# Patient Record
Sex: FEMALE | Race: WHITE | Employment: UNEMPLOYED | URBAN - METROPOLITAN AREA
[De-identification: names, ages, dates, MRNs, and addresses within clinical notes are randomized per-mention and may not be internally consistent; named-entity substitution may affect disease eponyms.]

---

## 2019-07-12 ENCOUNTER — HOSPITAL ENCOUNTER (OUTPATIENT)
Dept: GENERAL RADIOLOGY | Age: 24
Discharge: HOME | End: 2019-07-12
Payer: MEDICAID

## 2019-07-12 ENCOUNTER — HOSPITAL ENCOUNTER (OUTPATIENT)
Age: 24
Discharge: HOME | End: 2019-07-12
Payer: MEDICAID

## 2019-07-12 DIAGNOSIS — G91.9 HYDROCEPHALUS, UNSPECIFIED TYPE (HCC): ICD-10-CM

## 2019-07-12 PROCEDURE — 70360 X-RAY EXAM OF NECK: CPT

## 2021-12-13 ENCOUNTER — HOSPITAL ENCOUNTER (EMERGENCY)
Age: 26
Discharge: HOME | End: 2021-12-13
Attending: EMERGENCY MEDICINE
Payer: MEDICAID

## 2021-12-13 ENCOUNTER — APPOINTMENT (OUTPATIENT)
Dept: GENERAL RADIOLOGY | Age: 26
End: 2021-12-13
Payer: MEDICAID

## 2021-12-13 ENCOUNTER — APPOINTMENT (OUTPATIENT)
Dept: CT IMAGING | Age: 26
End: 2021-12-13
Payer: MEDICAID

## 2021-12-13 VITALS
SYSTOLIC BLOOD PRESSURE: 103 MMHG | RESPIRATION RATE: 18 BRPM | OXYGEN SATURATION: 95 % | WEIGHT: 223 LBS | HEART RATE: 79 BPM | BODY MASS INDEX: 35.84 KG/M2 | HEIGHT: 66 IN | TEMPERATURE: 97.3 F | DIASTOLIC BLOOD PRESSURE: 77 MMHG

## 2021-12-13 DIAGNOSIS — G40.919 BREAKTHROUGH SEIZURE (HCC): Primary | ICD-10-CM

## 2021-12-13 DIAGNOSIS — R56.9 SEIZURE-LIKE ACTIVITY (HCC): ICD-10-CM

## 2021-12-13 LAB
ALBUMIN SERPL-MCNC: 4.1 G/DL (ref 3.4–5)
ALP BLD-CCNC: 71 U/L (ref 40–129)
ALT SERPL-CCNC: 39 U/L (ref 10–40)
AMMONIA: 21 UMOL/L (ref 11–51)
ANION GAP SERPL CALCULATED.3IONS-SCNC: 13 MMOL/L (ref 3–16)
AST SERPL-CCNC: 28 U/L (ref 15–37)
BASOPHILS ABSOLUTE: 0 K/UL (ref 0–0.2)
BASOPHILS RELATIVE PERCENT: 0.6 %
BILIRUB SERPL-MCNC: <0.2 MG/DL (ref 0–1)
BILIRUBIN DIRECT: <0.2 MG/DL (ref 0–0.3)
BILIRUBIN, INDIRECT: NORMAL MG/DL (ref 0–1)
BUN BLDV-MCNC: 12 MG/DL (ref 7–20)
CALCIUM SERPL-MCNC: 9.1 MG/DL (ref 8.3–10.6)
CHLORIDE BLD-SCNC: 102 MMOL/L (ref 99–110)
CO2: 24 MMOL/L (ref 21–32)
CREAT SERPL-MCNC: 0.6 MG/DL (ref 0.6–1.1)
EOSINOPHILS ABSOLUTE: 0.1 K/UL (ref 0–0.6)
EOSINOPHILS RELATIVE PERCENT: 2 %
GFR AFRICAN AMERICAN: >60
GFR NON-AFRICAN AMERICAN: >60
GLUCOSE BLD-MCNC: 191 MG/DL (ref 70–99)
HCG QUALITATIVE: NEGATIVE
HCT VFR BLD CALC: 36.3 % (ref 36–48)
HEMOGLOBIN: 11.8 G/DL (ref 12–16)
LYMPHOCYTES ABSOLUTE: 2.1 K/UL (ref 1–5.1)
LYMPHOCYTES RELATIVE PERCENT: 36.2 %
MCH RBC QN AUTO: 26 PG (ref 26–34)
MCHC RBC AUTO-ENTMCNC: 32.6 G/DL (ref 31–36)
MCV RBC AUTO: 79.5 FL (ref 80–100)
MONOCYTES ABSOLUTE: 0.3 K/UL (ref 0–1.3)
MONOCYTES RELATIVE PERCENT: 4.6 %
NEUTROPHILS ABSOLUTE: 3.3 K/UL (ref 1.7–7.7)
NEUTROPHILS RELATIVE PERCENT: 56.6 %
PDW BLD-RTO: 14.4 % (ref 12.4–15.4)
PLATELET # BLD: 244 K/UL (ref 135–450)
PMV BLD AUTO: 9.2 FL (ref 5–10.5)
POTASSIUM REFLEX MAGNESIUM: 4 MMOL/L (ref 3.5–5.1)
RBC # BLD: 4.56 M/UL (ref 4–5.2)
SODIUM BLD-SCNC: 139 MMOL/L (ref 136–145)
TOTAL PROTEIN: 7.3 G/DL (ref 6.4–8.2)
WBC # BLD: 5.8 K/UL (ref 4–11)

## 2021-12-13 PROCEDURE — 99285 EMERGENCY DEPT VISIT HI MDM: CPT

## 2021-12-13 PROCEDURE — 85025 COMPLETE CBC W/AUTO DIFF WBC: CPT

## 2021-12-13 PROCEDURE — 36415 COLL VENOUS BLD VENIPUNCTURE: CPT

## 2021-12-13 PROCEDURE — 6360000002 HC RX W HCPCS: Performed by: EMERGENCY MEDICINE

## 2021-12-13 PROCEDURE — 84703 CHORIONIC GONADOTROPIN ASSAY: CPT

## 2021-12-13 PROCEDURE — 80048 BASIC METABOLIC PNL TOTAL CA: CPT

## 2021-12-13 PROCEDURE — 6370000000 HC RX 637 (ALT 250 FOR IP): Performed by: EMERGENCY MEDICINE

## 2021-12-13 PROCEDURE — 96374 THER/PROPH/DIAG INJ IV PUSH: CPT

## 2021-12-13 PROCEDURE — 70450 CT HEAD/BRAIN W/O DYE: CPT

## 2021-12-13 PROCEDURE — 71045 X-RAY EXAM CHEST 1 VIEW: CPT

## 2021-12-13 PROCEDURE — 80076 HEPATIC FUNCTION PANEL: CPT

## 2021-12-13 PROCEDURE — 82140 ASSAY OF AMMONIA: CPT

## 2021-12-13 RX ORDER — LORAZEPAM 2 MG/ML
1 INJECTION INTRAMUSCULAR ONCE
Status: COMPLETED | OUTPATIENT
Start: 2021-12-13 | End: 2021-12-13

## 2021-12-13 RX ORDER — LEVETIRACETAM 500 MG/1
500 TABLET ORAL ONCE
Status: COMPLETED | OUTPATIENT
Start: 2021-12-13 | End: 2021-12-13

## 2021-12-13 RX ORDER — LORAZEPAM 1 MG/1
1 TABLET ORAL EVERY 6 HOURS PRN
Qty: 30 TABLET | Refills: 0 | Status: SHIPPED | OUTPATIENT
Start: 2021-12-13 | End: 2022-01-12

## 2021-12-13 RX ADMIN — LORAZEPAM 1 MG: 2 INJECTION INTRAMUSCULAR; INTRAVENOUS at 12:15

## 2021-12-13 RX ADMIN — LEVETIRACETAM 500 MG: 500 TABLET, FILM COATED ORAL at 12:14

## 2021-12-13 ASSESSMENT — PAIN SCALES - GENERAL: PAINLEVEL_OUTOF10: 7

## 2021-12-13 ASSESSMENT — PAIN DESCRIPTION - PAIN TYPE: TYPE: ACUTE PAIN

## 2021-12-13 ASSESSMENT — PAIN DESCRIPTION - LOCATION: LOCATION: HEAD;ABDOMEN

## 2021-12-13 NOTE — ED NOTES
Bed: 02  Expected date:   Expected time:   Means of arrival: [facility]  Comments:  MEDIC [id number] [facility] [place], RN  12/13/21 9941

## 2021-12-13 NOTE — ED TRIAGE NOTES
Pt arrived via EMS from home d/t seizure like activity. EMS gave Versed 5mg for \"shaking\". Pt A&Ox4. C/o headache and abd pain rated at 7/10.

## 2021-12-20 NOTE — ED PROVIDER NOTES
Emergency Department Encounter    Patient: Cheryl Montanez  MRN: [id number]  : 1995  Date of Evaluation: 2021  ED Provider:  Hamilton Rosenthal MD    Triage Chief Complaint:   Seizures (arrived via EMS from home d/t 5 seizure at noc; witnessed seizure uncontrolled shaking that started approx 0900 that has lasted approx 10 minutes; versed 5mg via EMS; )    [place]:  Cheryl Montanez is a 31 y.o. female that presents to the ER for evaluation of both seizure and pseudoseizure, with intermittent tremor which is awake and alert when occurring, can be distracted, has a history of partial complex seizure,  shunt. Currently on Keppra. ROS - see HPI, below listed is current ROS at time of my eval:  General:  No fevers, no chills, no weakness  Eyes:  No recent vison changes, no discharge  ENT:  No sore throat, no nasal congestion, no hearing changes  Cardiovascular:  No chest pain  Respiratory:  No shortness of breath  Gastrointestinal:  No pain, no nausea, no vomiting, no diarrhea  Musculoskeletal:  No muscle pain, no joint pain  Skin:  No rash, no pruritis, no easy bruising  Neurologic:  No speech problems, + headache, no extremity numbness, no extremity tingling, no extremity weakness, no neck pain or stiffness  Psychiatric:  + anxiety  Extremities:  no edema, no pain    Past Medical History:   Diagnosis Date    Diabetes ([facility] [address]     Seizures ([facility] [address]     Stroke [facility])      Past Surgical History:   Procedure Laterality Date    BRAIN SURGERY      shunt     History reviewed. No pertinent family history.   Social History     Socioeconomic History    Marital status: Single     Spouse name: Not on file    Number of children: Not on file    Years of education: Not on file    Highest education level: Not on file   Occupational History    Not on file   Tobacco Use    Smoking status: Never Smoker    Smokeless tobacco: Never Used   Substance and Sexual Activity    Alcohol use: No    Drug use: No    Sexual activity: Not on file   Other Topics Concern    Not on file   Social History Narrative    Not on file     Social Determinants of Health     Financial Resource Strain:     Difficulty of Paying Living Expenses: Not on file   Food Insecurity:     Worried About Running Out of Food in the Last Year: Not on file    Alexa of Food in the Last Year: Not on file   Transportation Needs:     Lack of Transportation (Medical): Not on file    Lack of Transportation (Non-Medical): Not on file   Physical Activity:     Days of Exercise per Week: Not on file    Minutes of Exercise per Session: Not on file   Stress:     Feeling of Stress : Not on file   Social Connections:     Frequency of Communication with Friends and Family: Not on file    Frequency of Social Gatherings with Friends and Family: Not on file    Attends Jew Services: Not on file    Active Member of [address] [place] [address] [employer] or Organizations: Not on file    Attends Club or Organization Meetings: Not on file    Marital Status: Not on file   Intimate Partner Violence:     Fear of Current or Ex-Partner: Not on file    Emotionally Abused: Not on file    Physically Abused: Not on file    Sexually Abused: Not on file   Housing Stability:     Unable to Pay for Housing in the Last Year: Not on file    Number of [place] in the Last Year: Not on file    Unstable Housing in the Last Year: Not on file     No current facility-administered medications for this encounter. Current Outpatient Medications   Medication Sig Dispense Refill    LORazepam (ATIVAN) 1 MG tablet Take 1 tablet by mouth every 6 hours as needed for Anxiety (prn tremor/seizure, please take 1 mg twice daily for 3 days) for up to 30 days.  30 tablet 0    levETIRAcetam (KEPPRA) 500 MG tablet Take 500 mg by mouth 2 times daily      SITagliptin (JANUVIA) 25 MG tablet Take 25 mg by mouth daily      glimepiride (AMARYL) 4 MG tablet Take 4 mg by mouth every morning (before breakfast) Allergies   Allergen Reactions    Ancef [Cefazolin] Hives       Nursing Notes Reviewed    Physical Exam:  Triage VS:    ED Triage Vitals [12/13/21 1122]   Enc Vitals Group      /74      Pulse 86      Resp 20      Temp 97.3 °F (36.3 °C)      Temp Source Oral      SpO2 97 %      Weight 223 lb (101.2 kg)      Height 5' 6\" (1.676 m)      Head Circumference       Peak Flow       Pain Score       Pain Loc       Pain Edu? Excl. in [address]? My pulse ox interpretation is  normal    General appearance:  No acute distress. Skin:  Warm. Dry. Eye:  Extraocular movements intact. PERRL   Ears, nose, mouth and throat:  Oral mucosa moist   Neck:  Trachea midline. Extremity:  No swelling. Normal ROM     Heart:  Regular  Perfusion:  intact  Respiratory: Respirations nonlabored. Abdominal:   Non distended.              Neurological:  Alert and oriented times 3.  5 out of 5 motor strength in extremities, sensation intact to light touch in extremities, cranial nerves           grossly intact, visual fields intact, negative pronator drift, rapid alternating movements intact    I have reviewed and interpreted all of the currently available lab results from this visit (if applicable):  Results for orders placed or performed during the hospital encounter of 12/13/21   Ammonia   Result Value Ref Range    Ammonia 21 11 - 51 umol/L   Hepatic Function Panel   Result Value Ref Range    Total Protein 7.3 6.4 - 8.2 g/dL    Albumin 4.1 3.4 - 5.0 g/dL    Alkaline Phosphatase 71 40 - 129 U/L    ALT 39 10 - 40 U/L    AST 28 15 - 37 U/L    Total Bilirubin <0.2 0.0 - 1.0 mg/dL    Bilirubin, Direct <0.2 0.0 - 0.3 mg/dL    Bilirubin, Indirect see below 0.0 - 1.0 mg/dL   Basic Metabolic Panel w/ Reflex to MG   Result Value Ref Range    Sodium 139 136 - 145 mmol/L    Potassium reflex Magnesium 4.0 3.5 - 5.1 mmol/L    Chloride 102 99 - 110 mmol/L    CO2 24 21 - 32 mmol/L    Anion Gap 13 3 - 16    Glucose 191 (H) 70 - 99 mg/dL BUN 12 7 - 20 mg/dL    CREATININE 0.6 0.6 - 1.1 mg/dL    GFR Non-African American >60 >60    GFR African American >60 >60    Calcium 9.1 8.3 - 10.6 mg/dL   CBC Auto Differential   Result Value Ref Range    WBC 5.8 4.0 - 11.0 K/uL    RBC 4.56 4.00 - 5.20 M/uL    Hemoglobin 11.8 (L) 12.0 - 16.0 g/dL    Hematocrit 36.3 36.0 - 48.0 %    MCV 79.5 (L) 80.0 - 100.0 fL    MCH 26.0 26.0 - 34.0 pg    MCHC 32.6 31.0 - 36.0 g/dL    RDW 14.4 12.4 - 15.4 %    Platelets 498 084 - 855 K/uL    MPV 9.2 5.0 - 10.5 fL    Neutrophils % 56.6 %    Lymphocytes % 36.2 %    Monocytes % 4.6 %    Eosinophils % 2.0 %    Basophils % 0.6 %    Neutrophils Absolute 3.3 1.7 - 7.7 K/uL    Lymphocytes Absolute 2.1 1.0 - 5.1 K/uL    Monocytes Absolute 0.3 0.0 - 1.3 K/uL    Eosinophils Absolute 0.1 0.0 - 0.6 K/uL    Basophils Absolute 0.0 0.0 - 0.2 K/uL   HCG Qualitative, Serum   Result Value Ref Range    hCG Qual Negative Detects HCG level >10 MIU/mL      Radiographs (if obtained):  Radiologist's Report Reviewed:  CT HEAD WO CONTRAST    Result Date: 12/15/2021  EXAMINATION: CT OF THE HEAD WITHOUT CONTRAST  12/13/2021 11:51 am TECHNIQUE: CT of the head was performed without the administration of intravenous contrast. Dose modulation, iterative reconstruction, and/or weight based adjustment of the mA/kV was utilized to reduce the radiation dose to as low as reasonably achievable. COMPARISON: 02/22/2017. HISTORY: ORDERING SYSTEM PROVIDED HISTORY: sz,  shunt, ro obstruction TECHNOLOGIST PROVIDED HISTORY: Reason for exam:->sz,  shunt, ro obstruction Has a \"code stroke\" or \"stroke alert\" been called? ->No Decision Support Exception - unselect if not a suspected or confirmed emergency medical condition->Emergency Medical Condition (MA) Is the patient pregnant?->No Reason for Exam: sz,  shunt, ro obstruction FINDINGS: BRAIN/VENTRICLES: Right frontal ventriculostomy catheter is again identified. Distal tip of the catheter is unchanged in position.   The distal tip lies to the left of midline in the region of the frontal horn of the left lateral ventricle. The ventricular system remains diminutive in size, unchanged. No evidence of mass effect or midline shift. Minimal low attenuation along the course of the catheter within the right frontal lobe is unchanged. Foci of low attenuation in right basal ganglia/periventricular region again identified which may represent tiny foci remote ischemic change, unchanged. No new area of abnormal attenuation of brain parenchyma is identified. No abnormal extra-axial fluid collection is identified. ORBITS: The visualized portion of the orbits demonstrate no acute abnormality. SINUSES: The visualized paranasal sinuses and mastoid air cells demonstrate no acute abnormality. SOFT TISSUES/SKULL:  No acute abnormality of the visualized skull or soft tissues. No evidence of acute intracranial abnormality with no significant change in the appearance of the head CT when compared to the study of 02/22/2017. RECOMMENDATIONS: Unavailable     XR SHUNT SERIES PLACEMENT (<4 VIEWS)    Result Date: 12/13/2021  EXAMINATION: SHUNT SERIES 12/13/2021 12:05 pm COMPARISON: CT head, 12/13/2021 Shunt series, 07/12/2019 HISTORY: ORDERING SYSTEM PROVIDED HISTORY: sz, pain, ro obsturction TECHNOLOGIST PROVIDED HISTORY: Reason for exam:->sz, pain, ro obsturction Reason for Exam: sz, pain, ro obsturction FINDINGS: From right frontal approach, there is a ventriculostomy catheter. Intracranial portion is also visualized on CT with tip crossing midline. The extracranial portion has a contiguous course through the right neck and right-anterior chest.  The lower portion in the chest is difficult to visualize on the frontal view, but does appear contiguous on lateral projection. The catheter terminates in the left lower quadrant of the abdomen. No focal angulation or kinking is identified. Lungs are clear.   Bowel gas pattern is normal. Ventriculostomy catheter appears intact. MDM:  Patient presenting with a headache. Patient's neurologic exam is intact and nonfocal, and there is no evidence of meningeal signs. I completed a structured, evidence-based clinical evaluation to screen for subarachnoid hemorrhage and meningitis. The evidence indicates that the patient is very low risk for these and this is consistent with my clinical intuition. There is no history of significant head trauma. Patient was given medications here in the emergency department, on reevaluation patient is starting to feel better. The risk of further workup or hospitalization is likely higher than the risk of the patient having a subarachnoid hemorrhage or meningitis. It is, therefore, in the patients best interest not to do additional emergent testing, including but not limited to cranial imaging and or lumbar puncture her in the emergency department, based on the patient's presentation, history, and emergency department course, this was discussed with the patient and they understand and agree. At this point I do believe we can discharge patient, they need to follow-up with their primary care physician and/or neurologist, they understand and agree with the plan, return warnings given. Patient has no evidence of  shunt malfunction, no intracranial hemorrhage, no mass lesions, she had tremor-like episode which was able to be obtained with voice commands. Possible pseudoseizure versus partial complex breakthrough. She stable for outpatient management, I spoke with her neurologist she is to continue her Keppra and therapy, and Ativan as needed for breakthrough and anxiolysis. Clinical Impression:  1.  Breakthrough seizure ([address]    2. Seizure-like activity ([facility] [address]      Disposition referral (if applicable):  Tiara Nguyen MD  [address] [place] [address], [address] [place] [address] [phone]          Disposition medications (if applicable):  Discharge Medication List as of 12/13/2021  2:20 PM      START taking these medications    Details   LORazepam (ATIVAN) 1 MG tablet Take 1 tablet by mouth every 6 hours as needed for Anxiety (prn tremor/seizure, please take 1 mg twice daily for 3 days) for up to 30 days. , Disp-30 tablet, R-0Print             Comment: Please note this report has been produced using speech recognition software and may contain errors related to that system including errors in grammar, punctuation, and spelling, as well as words and phrases that may be inappropriate. Efforts were made to edit the dictations.       Darcie Painter MD  31/20/47 0596

## 2022-01-16 ENCOUNTER — HOSPITAL ENCOUNTER (INPATIENT)
Age: 27
LOS: 2 days | Discharge: HOME | DRG: 137 | End: 2022-01-18
Attending: EMERGENCY MEDICINE | Admitting: INTERNAL MEDICINE
Payer: MEDICAID

## 2022-01-16 ENCOUNTER — APPOINTMENT (OUTPATIENT)
Dept: CT IMAGING | Age: 27
DRG: 137 | End: 2022-01-16
Payer: MEDICAID

## 2022-01-16 ENCOUNTER — APPOINTMENT (OUTPATIENT)
Dept: GENERAL RADIOLOGY | Age: 27
DRG: 137 | End: 2022-01-16
Payer: MEDICAID

## 2022-01-16 PROBLEM — R56.9 SEIZURES (HCC): Status: ACTIVE | Noted: 2022-01-16

## 2022-01-16 PROBLEM — U07.1 COVID-19: Status: ACTIVE | Noted: 2022-01-16

## 2022-01-16 PROBLEM — R55 SYNCOPE AND COLLAPSE: Status: ACTIVE | Noted: 2022-01-16

## 2022-01-16 PROBLEM — R56.9 SEIZURE (HCC): Status: ACTIVE | Noted: 2022-01-16

## 2022-01-16 LAB
A/G RATIO: 1.3 (ref 1.1–2.2)
ALBUMIN SERPL-MCNC: 4.1 G/DL (ref 3.4–5)
ALP BLD-CCNC: 70 U/L (ref 40–129)
ALT SERPL-CCNC: 48 U/L (ref 10–40)
ANION GAP SERPL CALCULATED.3IONS-SCNC: 12 MMOL/L (ref 3–16)
AST SERPL-CCNC: 40 U/L (ref 15–37)
BASOPHILS ABSOLUTE: 0 K/UL (ref 0–0.2)
BASOPHILS RELATIVE PERCENT: 0.5 %
BILIRUB SERPL-MCNC: <0.2 MG/DL (ref 0–1)
BUN BLDV-MCNC: 7 MG/DL (ref 7–20)
CALCIUM SERPL-MCNC: 8.7 MG/DL (ref 8.3–10.6)
CHLORIDE BLD-SCNC: 102 MMOL/L (ref 99–110)
CO2: 21 MMOL/L (ref 21–32)
CREAT SERPL-MCNC: 0.7 MG/DL (ref 0.6–1.1)
EOSINOPHILS ABSOLUTE: 0.1 K/UL (ref 0–0.6)
EOSINOPHILS RELATIVE PERCENT: 1.2 %
GFR AFRICAN AMERICAN: >60
GFR NON-AFRICAN AMERICAN: >60
GLUCOSE BLD-MCNC: 119 MG/DL (ref 70–99)
GLUCOSE BLD-MCNC: 169 MG/DL (ref 70–99)
GONADOTROPIN, CHORIONIC (HCG) QUANT: <5 MIU/ML
HCT VFR BLD CALC: 38 % (ref 36–48)
HEMOGLOBIN: 12.6 G/DL (ref 12–16)
KEPPRA DOSE AMT: ABNORMAL
KEPPRA DOSE AMT: NORMAL
KEPPRA: <2 UG/ML (ref 6–46)
LYMPHOCYTES ABSOLUTE: 1.1 K/UL (ref 1–5.1)
LYMPHOCYTES RELATIVE PERCENT: 27.2 %
MCH RBC QN AUTO: 25.5 PG (ref 26–34)
MCHC RBC AUTO-ENTMCNC: 33 G/DL (ref 31–36)
MCV RBC AUTO: 77.2 FL (ref 80–100)
MONOCYTES ABSOLUTE: 0.2 K/UL (ref 0–1.3)
MONOCYTES RELATIVE PERCENT: 5.5 %
NEUTROPHILS ABSOLUTE: 2.7 K/UL (ref 1.7–7.7)
NEUTROPHILS RELATIVE PERCENT: 65.6 %
PDW BLD-RTO: 14.1 % (ref 12.4–15.4)
PERFORMED ON: ABNORMAL
PLATELET # BLD: 219 K/UL (ref 135–450)
PMV BLD AUTO: 8.1 FL (ref 5–10.5)
POTASSIUM SERPL-SCNC: 3.7 MMOL/L (ref 3.5–5.1)
PROLACTIN: 52.5 NG/ML
RBC # BLD: 4.92 M/UL (ref 4–5.2)
SARS-COV-2, NAAT: DETECTED
SODIUM BLD-SCNC: 135 MMOL/L (ref 136–145)
TOTAL PROTEIN: 7.3 G/DL (ref 6.4–8.2)
WBC # BLD: 4.1 K/UL (ref 4–11)

## 2022-01-16 PROCEDURE — 87635 SARS-COV-2 COVID-19 AMP PRB: CPT

## 2022-01-16 PROCEDURE — 6360000002 HC RX W HCPCS: Performed by: EMERGENCY MEDICINE

## 2022-01-16 PROCEDURE — 99285 EMERGENCY DEPT VISIT HI MDM: CPT

## 2022-01-16 PROCEDURE — 96365 THER/PROPH/DIAG IV INF INIT: CPT

## 2022-01-16 PROCEDURE — 84146 ASSAY OF PROLACTIN: CPT

## 2022-01-16 PROCEDURE — 36415 COLL VENOUS BLD VENIPUNCTURE: CPT

## 2022-01-16 PROCEDURE — 84702 CHORIONIC GONADOTROPIN TEST: CPT

## 2022-01-16 PROCEDURE — 72125 CT NECK SPINE W/O DYE: CPT

## 2022-01-16 PROCEDURE — 6370000000 HC RX 637 (ALT 250 FOR IP): Performed by: EMERGENCY MEDICINE

## 2022-01-16 PROCEDURE — 1200000000 HC SEMI PRIVATE

## 2022-01-16 PROCEDURE — 70250 X-RAY EXAM OF SKULL: CPT

## 2022-01-16 PROCEDURE — 85025 COMPLETE CBC W/AUTO DIFF WBC: CPT

## 2022-01-16 PROCEDURE — 70450 CT HEAD/BRAIN W/O DYE: CPT

## 2022-01-16 PROCEDURE — 80177 DRUG SCRN QUAN LEVETIRACETAM: CPT

## 2022-01-16 PROCEDURE — 93005 ELECTROCARDIOGRAM TRACING: CPT | Performed by: EMERGENCY MEDICINE

## 2022-01-16 PROCEDURE — 80053 COMPREHEN METABOLIC PANEL: CPT

## 2022-01-16 RX ORDER — LEVETIRACETAM 10 MG/ML
1000 INJECTION INTRAVASCULAR ONCE
Status: COMPLETED | OUTPATIENT
Start: 2022-01-16 | End: 2022-01-16

## 2022-01-16 RX ORDER — POLYETHYLENE GLYCOL 3350 17 G/17G
17 POWDER, FOR SOLUTION ORAL DAILY PRN
Status: DISCONTINUED | OUTPATIENT
Start: 2022-01-16 | End: 2022-01-18 | Stop reason: HOSPADM

## 2022-01-16 RX ORDER — NICOTINE POLACRILEX 4 MG
15 LOZENGE BUCCAL PRN
Status: DISCONTINUED | OUTPATIENT
Start: 2022-01-16 | End: 2022-01-18 | Stop reason: HOSPADM

## 2022-01-16 RX ORDER — LEVETIRACETAM 500 MG/1
500 TABLET ORAL 2 TIMES DAILY
Status: DISCONTINUED | OUTPATIENT
Start: 2022-01-16 | End: 2022-01-17

## 2022-01-16 RX ORDER — ONDANSETRON 2 MG/ML
4 INJECTION INTRAMUSCULAR; INTRAVENOUS EVERY 6 HOURS PRN
Status: DISCONTINUED | OUTPATIENT
Start: 2022-01-16 | End: 2022-01-18 | Stop reason: HOSPADM

## 2022-01-16 RX ORDER — SODIUM CHLORIDE 0.9 % (FLUSH) 0.9 %
5-40 SYRINGE (ML) INJECTION PRN
Status: DISCONTINUED | OUTPATIENT
Start: 2022-01-16 | End: 2022-01-18 | Stop reason: HOSPADM

## 2022-01-16 RX ORDER — DEXTROSE MONOHYDRATE 25 G/50ML
12.5 INJECTION, SOLUTION INTRAVENOUS PRN
Status: DISCONTINUED | OUTPATIENT
Start: 2022-01-16 | End: 2022-01-18 | Stop reason: HOSPADM

## 2022-01-16 RX ORDER — ACETAMINOPHEN 650 MG/1
650 SUPPOSITORY RECTAL EVERY 6 HOURS PRN
Status: DISCONTINUED | OUTPATIENT
Start: 2022-01-16 | End: 2022-01-18 | Stop reason: HOSPADM

## 2022-01-16 RX ORDER — SODIUM CHLORIDE 9 MG/ML
25 INJECTION, SOLUTION INTRAVENOUS PRN
Status: DISCONTINUED | OUTPATIENT
Start: 2022-01-16 | End: 2022-01-18 | Stop reason: HOSPADM

## 2022-01-16 RX ORDER — DEXTROSE MONOHYDRATE 50 MG/ML
100 INJECTION, SOLUTION INTRAVENOUS PRN
Status: DISCONTINUED | OUTPATIENT
Start: 2022-01-16 | End: 2022-01-18 | Stop reason: HOSPADM

## 2022-01-16 RX ORDER — SODIUM CHLORIDE 0.9 % (FLUSH) 0.9 %
5-40 SYRINGE (ML) INJECTION EVERY 12 HOURS SCHEDULED
Status: DISCONTINUED | OUTPATIENT
Start: 2022-01-16 | End: 2022-01-18 | Stop reason: HOSPADM

## 2022-01-16 RX ORDER — ACETAMINOPHEN 325 MG/1
650 TABLET ORAL ONCE
Status: COMPLETED | OUTPATIENT
Start: 2022-01-16 | End: 2022-01-16

## 2022-01-16 RX ORDER — ONDANSETRON 4 MG/1
4 TABLET, ORALLY DISINTEGRATING ORAL EVERY 8 HOURS PRN
Status: DISCONTINUED | OUTPATIENT
Start: 2022-01-16 | End: 2022-01-18 | Stop reason: HOSPADM

## 2022-01-16 RX ORDER — ACETAMINOPHEN 325 MG/1
650 TABLET ORAL EVERY 6 HOURS PRN
Status: DISCONTINUED | OUTPATIENT
Start: 2022-01-16 | End: 2022-01-18 | Stop reason: HOSPADM

## 2022-01-16 RX ADMIN — LEVETIRACETAM 1000 MG: 10 INJECTION, SOLUTION INTRAVENOUS at 16:34

## 2022-01-16 RX ADMIN — ACETAMINOPHEN 650 MG: 325 TABLET ORAL at 16:35

## 2022-01-16 ASSESSMENT — PAIN SCALES - GENERAL
PAINLEVEL_OUTOF10: 0
PAINLEVEL_OUTOF10: 5

## 2022-01-16 NOTE — ED PROVIDER NOTES
Week: Not on file    Minutes of Exercise per Session: Not on file   Stress:     Feeling of Stress : Not on file   Social Connections:     Frequency of Communication with Friends and Family: Not on file    Frequency of Social Gatherings with Friends and Family: Not on file    Attends Alevism Services: Not on file    Active Member of Clubs or Organizations: Not on file    Attends Club or Organization Meetings: Not on file    Marital Status: Not on file   Intimate Partner Violence:     Fear of Current or Ex-Partner: Not on file    Emotionally Abused: Not on file    Physically Abused: Not on file    Sexually Abused: Not on file   Housing Stability:     Unable to Pay for Housing in the Last Year: Not on file    Number of [place] in the Last Year: Not on file    Unstable Housing in the Last Year: Not on file     No current facility-administered medications for this encounter. Current Outpatient Medications   Medication Sig Dispense Refill    levETIRAcetam (KEPPRA) 500 MG tablet Take 500 mg by mouth 2 times daily      SITagliptin (JANUVIA) 25 MG tablet Take 25 mg by mouth daily      glimepiride (AMARYL) 4 MG tablet Take 4 mg by mouth every morning (before breakfast)       Allergies   Allergen Reactions    Ancef [Cefazolin] Hives       [unfilled]    Nursing Notes Reviewed    Physical Exam:  Vitals:    01/16/22 1400   BP: (!) 135/90   Pulse: 84   Resp: 16   Temp:    SpO2: 100%       GENERAL APPEARANCE: Awake and alert. Cooperative. No acute distress. HEAD: Normocephalic. Atraumatic. EYES: EOM's grossly intact. Sclera anicteric. ENT: Mucous membranes are moist. Tolerates saliva. No trismus. NECK: Supple. No meningismus. Trachea midline. HEART: RRR. Radial pulses 2+. LUNGS: Respirations unlabored. CTAB  ABDOMEN: Soft. Non-tender. No guarding or rebound. EXTREMITIES: No acute deformities. SKIN: Warm and dry. NEUROLOGICAL: No gross facial drooping.  Moves all 4 extremities spontaneously. PSYCHIATRIC: Normal mood.     I have reviewed and interpreted all of the currently available lab results from this visit (if applicable):  Results for orders placed or performed during the hospital encounter of 01/16/22   COVID-19, Rapid    Specimen: Nasopharyngeal Swab   Result Value Ref Range    SARS-CoV-2, NAAT DETECTED (A) Not Detected   Levetiracetam Level   Result Value Ref Range    KEPPRA Dose Amt Unknown    CBC Auto Differential   Result Value Ref Range    WBC 4.1 4.0 - 11.0 K/uL    RBC 4.92 4.00 - 5.20 M/uL    Hemoglobin 12.6 12.0 - 16.0 g/dL    Hematocrit 38.0 36.0 - 48.0 %    MCV 77.2 (L) 80.0 - 100.0 fL    MCH 25.5 (L) 26.0 - 34.0 pg    MCHC 33.0 31.0 - 36.0 g/dL    RDW 14.1 12.4 - 15.4 %    Platelets 788 203 - 939 K/uL    MPV 8.1 5.0 - 10.5 fL    Neutrophils % 65.6 %    Lymphocytes % 27.2 %    Monocytes % 5.5 %    Eosinophils % 1.2 %    Basophils % 0.5 %    Neutrophils Absolute 2.7 1.7 - 7.7 K/uL    Lymphocytes Absolute 1.1 1.0 - 5.1 K/uL    Monocytes Absolute 0.2 0.0 - 1.3 K/uL    Eosinophils Absolute 0.1 0.0 - 0.6 K/uL    Basophils Absolute 0.0 0.0 - 0.2 K/uL   Comprehensive Metabolic Panel   Result Value Ref Range    Sodium 135 (L) 136 - 145 mmol/L    Potassium 3.7 3.5 - 5.1 mmol/L    Chloride 102 99 - 110 mmol/L    CO2 21 21 - 32 mmol/L    Anion Gap 12 3 - 16    Glucose 169 (H) 70 - 99 mg/dL    BUN 7 7 - 20 mg/dL    CREATININE 0.7 0.6 - 1.1 mg/dL    GFR Non-African American >60 >60    GFR African American >60 >60    Calcium 8.7 8.3 - 10.6 mg/dL    Total Protein 7.3 6.4 - 8.2 g/dL    Albumin 4.1 3.4 - 5.0 g/dL    Albumin/Globulin Ratio 1.3 1.1 - 2.2    Total Bilirubin <0.2 0.0 - 1.0 mg/dL    Alkaline Phosphatase 70 40 - 129 U/L    ALT 48 (H) 10 - 40 U/L    AST 40 (H) 15 - 37 U/L   HCG, Quantitative, Pregnancy   Result Value Ref Range    hCG Quant <5.0 <5.0 mIU/mL   Prolactin   Result Value Ref Range    Prolactin 52.5 ng/mL        Radiographs (if obtained):  [] The following radiograph was interpreted by myself in the absence of a radiologist:  [x] Radiologist's Report Reviewed:  Narrative   EXAMINATION:   CT OF THE HEAD WITHOUT CONTRAST; CT OF THE CERVICAL SPINE WITHOUT CONTRAST   1/16/2022 12:28 pm       TECHNIQUE:   CT of the head was performed without the administration of intravenous   contrast. Dose modulation, iterative reconstruction, and/or weight based   adjustment of the mA/kV was utilized to reduce the radiation dose to as low   as reasonably achievable.; CT of the cervical spine was performed without the   administration of intravenous contrast. Multiplanar reformatted images are   provided for review. Dose modulation, iterative reconstruction, and/or weight   based adjustment of the mA/kV was utilized to reduce the radiation dose to as   low as reasonably achievable.       COMPARISON:   None.       HISTORY:   ORDERING SYSTEM PROVIDED HISTORY: tamara baker of shunt   TECHNOLOGIST PROVIDED HISTORY:   Reason for exam:->tamara baker of shunt   Has a \"code stroke\" or \"stroke alert\" been called? ->No   Decision Support Exception - unselect if not a suspected or confirmed   emergency medical condition->Emergency Medical Condition (MA)   Is the patient pregnant?->No   Reason for Exam: seizure today lt parietal headache, multiple falls, shunt       FINDINGS:   CT HEAD:       BRAIN/VENTRICLES: There is a right frontal ventriculostomy catheter.  Across   is the frontal horns of the lateral ventricles, terminating in the   contralateral caudate head.  Ventricles are decompressed.  There is gliosis   in the right frontal lobe along the ventriculostomy catheter and possibly a   2nd remote tract.  Otherwise attenuation in the brain parenchyma is normal.   The gray-white matter differentiation is distinct.  There is no hemorrhage or   mass effect.  There is stable, mild cerebellar tonsillar ectopia.       ORBITS: The visualized portion of the orbits demonstrate no acute abnormality.       SINUSES: There is minimal inflammatory disease of the sphenoid sinus.  There   is mild mucosal thickening of scattered ethmoid air cells.       SOFT TISSUES/SKULL: No acute abnormality of the visualized skull or soft   tissues.       ---       CT CERVICAL SPINE:       BONES/ALIGNMENT: There is straightening of the cervical spine.  There is no   fracture or subluxation.       DEGENERATIVE CHANGES: No significant degenerative changes are seen.       SOFT TISSUES: There is no prevertebral soft tissue swelling.  The right   ventriculostomy catheter has a contiguous course in the lateral aspect of the   right neck.           Impression   Head-       No acute intracranial abnormality.       Right frontal ventriculostomy catheter is in stable position, with stable   decompression of the ventricles.       Minimal inflammatory disease of the paranasal sinuses, including the   sphenoids.       ---       Cervical spine-       Straightening of the cervical spine.       No acute osseous abnormality or significant spondylosis.             Narrative   EXAMINATION:   SHUNT SERIES       1/16/2022 12:43 pm       COMPARISON:   None.       HISTORY:   ORDERING SYSTEM PROVIDED HISTORY: ?samuel   TECHNOLOGIST PROVIDED HISTORY:   Reason for exam:->?samuel   Reason for Exam: Seizures shunt placement       FINDINGS:   There is a right frontal ventriculostomy catheter which intracranially   crosses midline, in expected normal position.  The catheter is contiguous.    Felida region is unremarkable.  The catheter courses along the right neck   and chest.  In the right upper chest, at the sternal body, the catheter is   difficult to visualize, but appears contiguous.  It crosses midline at the   xiphoid region and is coiled in curved in the left lower quadrant.           Impression   Right frontal ventriculostomy catheter appears intact.  Portion of the mid   chest is difficult to visualize, related to large body habitus.       RECOMMENDATION:           EKG (if obtained): (All EKG's are interpreted by myself in the absence of a cardiologist)  Initial EKG on my interpretation shows EKG interpreted by me as normal sinus rhythm with rate of 86 previous per minute, , QTc 488. No ST segment elevation. MDM:  Differential diagnosis: Seizure, syncope, ICH, CVA,    COVID positive. The patient's labs are unremarkable. The patient's not pregnant. CT of the head/CS along with chest x-ray shows no emergent process. Of note at approximately 1:27 PM I was told that she was getting up to go the bathroom with assistance, although I did not approve of this decision as she was under seizure precautions, and she had another episode. Upon my examination of her she is responsive and not actively seizing. I spoke to the tech and she states what happened was the patient got up to go use the restroom and then passed out, the tech caught her and guided her to the ground there was no head strike. There was no seizure activity reported. Again upon my examination as well as called her into the room the patient is alert. She notes no pain after the event and she was guided to the ground by the tech and there was no unaided fall or head strike. I spoke to Dr. Sheorn Amor her neurologist and made him aware of the events. He does recommend giving her 1g Keppra as a loading dose but does not feel she necessarily needs transfer/cEEG. I then spoke to Dr. Keira Saldana of Neuro here who is in agreement and our neurology service will see her inpatient here. Patient will be admitted to hospital with neurology consulted. Of note Keppra level here less than 2, 1 g of Keppra ordered      Patient admitted    Old records reviewed. Labs and imaging reviewed and results discussed with patient. .        Patient was given scripts for the following medications. I counseled patient how to take these medications.    New Prescriptions    No medications on file         CRITICAL CARE TIME   Total Critical Care time was 0 minutes, excluding separately reportable procedures. There was a high probability of clinically significant/life threatening deterioration in the patient's condition which required my urgent intervention.       Clinical Impression:  Syncope, history of seizures, COVID infection, subtherapeutic AED level  (Please note that portions of this note may have been completed with a voice recognition program. Efforts were made to edit the dictations but occasionally words are mis-transcribed.)    MD Nenita Haider MD  01/16/22 0770

## 2022-01-16 NOTE — ED NOTES
POST FALL MANAGEMENT    Ishmael Bansal  MEDICAL RECORD NUMBER:  [id number]  AGE: 31 y.o. GENDER: female  : 1995  TODAYS DATE:  2022    Details     Fall Occurred: Yes    Was the Fall Witnessed:  Yes         Brief Review of Event while Gael tech assisting patient up to restroom patient complains of feeling dizzy gael assist patient to floor at this time. Patient awake and alert when this nurse walks into room denies injury Laban Mcardle called to room and patient lifting back into bed per staff         Who found the patient: tech Gael       Where was the patient at the time of the fall: with nursing staff and lowered to floor when passed out didn't find on floor      Patient Comments: patient denies injury moved back to bed per staff on backboard to assist with lifting patient. Hx of passing out when standing up out of bed. Date Fall Occurred:  2022 . Time Fall Occurred: 1:30       Assessment   p.m.   Post Fall Head to Toe Assessment Completed: Yes    Post Aneita Beecham and ABCDS Completed:        Post Fall Vitals Completed: Yes    Post Fall Neuro Checks Completed: No:     Injury Occurred(if yes, describe injury):  no           Did the Patient Experience:(Check Marlyn Wheeler all that apply)    [] Patient hit head  [] Loss of consciousness  [] Change in mental status following the fall  [] Patient is on an anticoagulant medication      CT Performed:  no    Follow-up     Persons Notified of Fall:  (Provide names of persons notified)   [] Physician:   [] OBEY:  [] Nursing Supervisior:  [] Manager:  [] Pharmacist:  [] Family:  [] Other:      Electronically signed by Shaw Dimas RN 2022 at 1:40 PM       Shaw Dimas RN  22 [address] [place], [place] [id number]  [phone]

## 2022-01-16 NOTE — ED NOTES
On the basis of positive falls risk screening, assessment and plan is as follows: home safety tips provided.        Kwasi Berman RN  01/16/22 1229

## 2022-01-16 NOTE — ED TRIAGE NOTES
While in bathtub started coughing mom found her passed out with eyes rolled into back of head patient has hx of seizures and her mom tested positive for covid yesterday

## 2022-01-16 NOTE — ED NOTES
Bed: E-45  Expected date: 1/16/22  Expected time: 11:51 AM  Means of arrival:   Comments:  26F from home, seizure, post ictal, BP-90/54, BG-221, 20G in LAC with fluids infusing, [facility]     Teresa Jackman RN  01/16/22 5218

## 2022-01-17 LAB
A/G RATIO: 1.3 (ref 1.1–2.2)
ALBUMIN SERPL-MCNC: 3.9 G/DL (ref 3.4–5)
ALP BLD-CCNC: 61 U/L (ref 40–129)
ALT SERPL-CCNC: 42 U/L (ref 10–40)
ANION GAP SERPL CALCULATED.3IONS-SCNC: 11 MMOL/L (ref 3–16)
ANION GAP SERPL CALCULATED.3IONS-SCNC: 12 MMOL/L (ref 3–16)
AST SERPL-CCNC: 32 U/L (ref 15–37)
BASOPHILS ABSOLUTE: 0 K/UL (ref 0–0.2)
BASOPHILS RELATIVE PERCENT: 0.5 %
BILIRUB SERPL-MCNC: 0.4 MG/DL (ref 0–1)
BUN BLDV-MCNC: 10 MG/DL (ref 7–20)
BUN BLDV-MCNC: 11 MG/DL (ref 7–20)
CALCIUM SERPL-MCNC: 8.6 MG/DL (ref 8.3–10.6)
CALCIUM SERPL-MCNC: 8.7 MG/DL (ref 8.3–10.6)
CHLORIDE BLD-SCNC: 105 MMOL/L (ref 99–110)
CHLORIDE BLD-SCNC: 105 MMOL/L (ref 99–110)
CO2: 20 MMOL/L (ref 21–32)
CO2: 21 MMOL/L (ref 21–32)
CREAT SERPL-MCNC: 0.5 MG/DL (ref 0.6–1.1)
CREAT SERPL-MCNC: 0.6 MG/DL (ref 0.6–1.1)
EKG ATRIAL RATE: 86 BPM
EKG DIAGNOSIS: NORMAL
EKG P AXIS: 38 DEGREES
EKG P-R INTERVAL: 146 MS
EKG Q-T INTERVAL: 408 MS
EKG QRS DURATION: 94 MS
EKG QTC CALCULATION (BAZETT): 488 MS
EKG R AXIS: 74 DEGREES
EKG T AXIS: 60 DEGREES
EKG VENTRICULAR RATE: 86 BPM
EOSINOPHILS ABSOLUTE: 0 K/UL (ref 0–0.6)
EOSINOPHILS RELATIVE PERCENT: 1.3 %
ESTIMATED AVERAGE GLUCOSE: 191.5 MG/DL
GFR AFRICAN AMERICAN: >60
GFR AFRICAN AMERICAN: >60
GFR NON-AFRICAN AMERICAN: >60
GFR NON-AFRICAN AMERICAN: >60
GLUCOSE BLD-MCNC: 150 MG/DL (ref 70–99)
GLUCOSE BLD-MCNC: 157 MG/DL (ref 70–99)
GLUCOSE BLD-MCNC: 165 MG/DL (ref 70–99)
GLUCOSE BLD-MCNC: 166 MG/DL (ref 70–99)
GLUCOSE BLD-MCNC: 171 MG/DL (ref 70–99)
GLUCOSE BLD-MCNC: 175 MG/DL (ref 70–99)
HBA1C MFR BLD: 8.3 %
HCT VFR BLD CALC: 36.3 % (ref 36–48)
HEMOGLOBIN: 12.2 G/DL (ref 12–16)
LV EF: 63 %
LVEF MODALITY: NORMAL
LYMPHOCYTES ABSOLUTE: 1.6 K/UL (ref 1–5.1)
LYMPHOCYTES RELATIVE PERCENT: 49.3 %
MCH RBC QN AUTO: 25.8 PG (ref 26–34)
MCHC RBC AUTO-ENTMCNC: 33.5 G/DL (ref 31–36)
MCV RBC AUTO: 76.9 FL (ref 80–100)
MONOCYTES ABSOLUTE: 0.3 K/UL (ref 0–1.3)
MONOCYTES RELATIVE PERCENT: 9.1 %
NEUTROPHILS ABSOLUTE: 1.2 K/UL (ref 1.7–7.7)
NEUTROPHILS RELATIVE PERCENT: 39.8 %
PDW BLD-RTO: 14.4 % (ref 12.4–15.4)
PERFORMED ON: ABNORMAL
PLATELET # BLD: 200 K/UL (ref 135–450)
PMV BLD AUTO: 8.4 FL (ref 5–10.5)
POTASSIUM REFLEX MAGNESIUM: 3.8 MMOL/L (ref 3.5–5.1)
POTASSIUM SERPL-SCNC: 3.5 MMOL/L (ref 3.5–5.1)
RBC # BLD: 4.72 M/UL (ref 4–5.2)
SODIUM BLD-SCNC: 137 MMOL/L (ref 136–145)
SODIUM BLD-SCNC: 137 MMOL/L (ref 136–145)
TOTAL PROTEIN: 7 G/DL (ref 6.4–8.2)
WBC # BLD: 3.1 K/UL (ref 4–11)

## 2022-01-17 PROCEDURE — 1200000000 HC SEMI PRIVATE

## 2022-01-17 PROCEDURE — 6370000000 HC RX 637 (ALT 250 FOR IP): Performed by: INTERNAL MEDICINE

## 2022-01-17 PROCEDURE — 6360000002 HC RX W HCPCS: Performed by: INTERNAL MEDICINE

## 2022-01-17 PROCEDURE — 99254 IP/OBS CNSLTJ NEW/EST MOD 60: CPT | Performed by: NURSE PRACTITIONER

## 2022-01-17 PROCEDURE — 97162 PT EVAL MOD COMPLEX 30 MIN: CPT

## 2022-01-17 PROCEDURE — 36415 COLL VENOUS BLD VENIPUNCTURE: CPT

## 2022-01-17 PROCEDURE — 6370000000 HC RX 637 (ALT 250 FOR IP): Performed by: NURSE PRACTITIONER

## 2022-01-17 PROCEDURE — 85025 COMPLETE CBC W/AUTO DIFF WBC: CPT

## 2022-01-17 PROCEDURE — 93306 TTE W/DOPPLER COMPLETE: CPT

## 2022-01-17 PROCEDURE — 2580000003 HC RX 258: Performed by: INTERNAL MEDICINE

## 2022-01-17 PROCEDURE — 83036 HEMOGLOBIN GLYCOSYLATED A1C: CPT

## 2022-01-17 PROCEDURE — 97530 THERAPEUTIC ACTIVITIES: CPT

## 2022-01-17 PROCEDURE — 93010 ELECTROCARDIOGRAM REPORT: CPT | Performed by: INTERNAL MEDICINE

## 2022-01-17 PROCEDURE — 80053 COMPREHEN METABOLIC PANEL: CPT

## 2022-01-17 RX ORDER — LEVETIRACETAM 500 MG/1
1500 TABLET ORAL 2 TIMES DAILY
Status: DISCONTINUED | OUTPATIENT
Start: 2022-01-17 | End: 2022-01-18 | Stop reason: HOSPADM

## 2022-01-17 RX ORDER — TOPIRAMATE 25 MG/1
25 TABLET ORAL NIGHTLY
Status: DISCONTINUED | OUTPATIENT
Start: 2022-01-17 | End: 2022-01-18 | Stop reason: HOSPADM

## 2022-01-17 RX ORDER — EXENATIDE 2 MG/.65ML
2 INJECTION, SUSPENSION, EXTENDED RELEASE SUBCUTANEOUS WEEKLY
COMMUNITY

## 2022-01-17 RX ORDER — TOPIRAMATE 25 MG/1
25 TABLET ORAL NIGHTLY
COMMUNITY

## 2022-01-17 RX ADMIN — ENOXAPARIN SODIUM 30 MG: 100 INJECTION SUBCUTANEOUS at 00:10

## 2022-01-17 RX ADMIN — INSULIN LISPRO 1 UNITS: 100 INJECTION, SOLUTION INTRAVENOUS; SUBCUTANEOUS at 12:04

## 2022-01-17 RX ADMIN — ENOXAPARIN SODIUM 30 MG: 100 INJECTION SUBCUTANEOUS at 22:20

## 2022-01-17 RX ADMIN — ACETAMINOPHEN 650 MG: 325 TABLET ORAL at 13:31

## 2022-01-17 RX ADMIN — SODIUM CHLORIDE, PRESERVATIVE FREE 10 ML: 5 INJECTION INTRAVENOUS at 22:20

## 2022-01-17 RX ADMIN — SODIUM CHLORIDE, PRESERVATIVE FREE 10 ML: 5 INJECTION INTRAVENOUS at 00:13

## 2022-01-17 RX ADMIN — ONDANSETRON 4 MG: 2 INJECTION INTRAMUSCULAR; INTRAVENOUS at 00:10

## 2022-01-17 RX ADMIN — INSULIN LISPRO 1 UNITS: 100 INJECTION, SOLUTION INTRAVENOUS; SUBCUTANEOUS at 09:03

## 2022-01-17 RX ADMIN — TOPIRAMATE 25 MG: 25 TABLET, FILM COATED ORAL at 22:20

## 2022-01-17 RX ADMIN — ENOXAPARIN SODIUM 30 MG: 100 INJECTION SUBCUTANEOUS at 09:03

## 2022-01-17 RX ADMIN — LEVETIRACETAM 1500 MG: 500 TABLET, FILM COATED ORAL at 01:03

## 2022-01-17 RX ADMIN — LEVETIRACETAM 1500 MG: 500 TABLET, FILM COATED ORAL at 22:20

## 2022-01-17 RX ADMIN — SODIUM CHLORIDE, PRESERVATIVE FREE 10 ML: 5 INJECTION INTRAVENOUS at 09:04

## 2022-01-17 RX ADMIN — LEVETIRACETAM 1500 MG: 500 TABLET, FILM COATED ORAL at 09:03

## 2022-01-17 RX ADMIN — ONDANSETRON 4 MG: 2 INJECTION INTRAMUSCULAR; INTRAVENOUS at 09:03

## 2022-01-17 RX ADMIN — INSULIN LISPRO 1 UNITS: 100 INJECTION, SOLUTION INTRAVENOUS; SUBCUTANEOUS at 17:03

## 2022-01-17 ASSESSMENT — PAIN SCALES - GENERAL: PAINLEVEL_OUTOF10: 10

## 2022-01-17 NOTE — PROGRESS NOTES
Writer assisted pt to bathroom via stedy. Pt had seizure like activity  for a very short period of time less than half minute while she sitting on toilet. Pt awake and alert during seizure activity. Pt c/o of feeling dizzy at times. Pt back into bed with second RN. Pt resting in bed, no distress noted. Seizure precautions  in placed. Bed in low positions. Call light and bedside table within pt reach. Will continue to monitor.

## 2022-01-17 NOTE — PROGRESS NOTES
Occupational Therapy    OT referral received and appreciated. Pt's chart reviewed. RN requesting to hold OT evaluation this date due to orthostatic BP. Will follow next date as schedule permits.      Electronically signed by JOSÉ Shin on 1/17/2022 at 1:14 PM

## 2022-01-17 NOTE — PROGRESS NOTES
Progress Note  Admit Date: 1/16/2022      PCP: Gume Saucedo     CC: F/U for seizure/ syncope    Days in hospital:  1    SUBJECTIVE / Interval History:  Patient feels okay. No complaints at present   Episode of syncope was working with therapy      Allergies  Ancef [cefazolin]    Medications    Scheduled Meds:   levETIRAcetam  1,500 mg Oral BID    sodium chloride flush  5-40 mL IntraVENous 2 times per day    insulin lispro  0-6 Units SubCUTAneous TID WC    insulin lispro  0-3 Units SubCUTAneous Nightly    enoxaparin  30 mg SubCUTAneous BID     Continuous Infusions:   sodium chloride      dextrose         PRN Meds:  sodium chloride flush, sodium chloride, ondansetron **OR** ondansetron, polyethylene glycol, acetaminophen **OR** acetaminophen, glucose, dextrose, glucagon (rDNA), dextrose    Vitals    /60   Pulse 81   Temp 97.9 °F (36.6 °C) (Oral)   Resp 18   Ht 5' 7\" (1.702 m)   Wt 233 lb 14.5 oz (106.1 kg)   LMP 12/26/2021 (Approximate)   SpO2 97%   BMI 36.64 kg/m²     Exam:    Gen: No distress. Eyes: PERRL. No sclera icterus. No conjunctival injection. ENT: No discharge. Pharynx clear. External appearance of ears and nose normal.  Neck: Trachea midline. No obvious mass. Resp: No accessory muscle use. No crackles. No wheezes. No rhonchi. No dullness on percussion. CV: Regular rate. Regular rhythm. No murmur or rub. No edema. GI: Non-tender. Non-distended. No hernia. Skin: Warm, dry, normal texture and turgor. No nodule on exposed extremities. Lymph: No cervical LAD. No supraclavicular LAD. M/S: No cyanosis. No clubbing. No joint deformity. Neuro: Moves all four extremities. CN 2-12 tested, no defect noted. Psych: Oriented x 3. No anxiety. Awake. Alert. Intact judgement and insight.     Data    LABS  CBC:   Recent Labs     01/16/22  1219   WBC 4.1   HGB 12.6   HCT 38.0   MCV 77.2*        BMP:   Recent Labs     01/16/22  1219   *   K 3.7      CO2 21   BUN 7   CREATININE 0.7   GLUCOSE 169*     POC GLUCOSE:    Recent Labs     01/16/22  2338 01/17/22  0815   POCGLU 119* 157*     LIVER PROFILE:   Recent Labs     01/16/22  1219   AST 40*   ALT 48*   LABALBU 4.1   BILITOT <0.2   ALKPHOS 70     PT/INR: No results for input(s): PROTIME, INR in the last 72 hours. APTT: No results for input(s): APTT in the last 72 hours. UA:No results for input(s): NITRITE, COLORU, PHUR, LABCAST, WBCUA, RBCUA, MUCUS, TRICHOMONAS, YEAST, BACTERIA, CLARITYU, SPECGRAV, LEUKOCYTESUR, UROBILINOGEN, BILIRUBINUR, BLOODU, GLUCOSEU, KETUA, AMORPHOUS in the last 72 hours. Microbiology:  Wound Culture: No results for input(s): WNDABS, ORG in the last 72 hours. Invalid input(s):  LABGRAM  Nasal Culture: No results for input(s): ORG, MRSAPCR in the last 72 hours. Blood Culture: No results for input(s): BC, BLOODCULT2 in the last 72 hours. Fungal Culture:   No results for input(s): FUNGSM in the last 72 hours. No results for input(s): FUNCXBLD in the last 72 hours. CSF Culture:  No results for input(s): COLORCSF, APPEARCSF, CFTUBE, CLOTCSF, WBCCSF, RBCCSF, NEUTCSF, NUMCELLSCSF, LYMPHSCSF, MONOCSF, GLUCCSF, VOLCSF in the last 72 hours. Respiratory Culture:  No results for input(s): [place] in the last 72 hours. AFB:No results for input(s): AFBSMEAR in the last 72 hours. Urine Culture  No results for input(s): LABURIN in the last 72 hours. RADIOLOGY:    CT HEAD WO CONTRAST   Final Result   Head-      No acute intracranial abnormality. Right frontal ventriculostomy catheter is in stable position, with stable   decompression of the ventricles. Minimal inflammatory disease of the paranasal sinuses, including the   sphenoids. ---      Cervical spine-      Straightening of the cervical spine. No acute osseous abnormality or significant spondylosis. CT CERVICAL SPINE WO CONTRAST   Final Result   Head-      No acute intracranial abnormality.       Right frontal ventriculostomy catheter is in stable position, with stable   decompression of the ventricles. Minimal inflammatory disease of the paranasal sinuses, including the   sphenoids. ---      Cervical spine-      Straightening of the cervical spine. No acute osseous abnormality or significant spondylosis. XR SHUNT SERIES PLACEMENT (<4 VIEWS)   Final Result   Right frontal ventriculostomy catheter appears intact. Portion of the mid   chest is difficult to visualize, related to large body habitus. RECOMMENDATION:             CONSULTS:    IP CONSULT TO NEUROLOGY  IP CONSULT TO NEUROLOGY  IP CONSULT TO NEUROLOGY  IP CONSULT TO CARDIOLOGY    ASSESSMENT AND PLAN:      Active Problems:    Syncope and collapse    Seizure ([address]    COVID-19    Seizures ([facility])  Resolved Problems:    * No resolved hospital problems. *    Patient is a 79-year-old female with a past medical history of ADHD, congenital hydrocephalus, epilepsy, metabolic syndrome, hyperlipidemia, diabetes who presented after she was found unresponsive in her tub. Mom noticed patient's eyes rolling and she was admitted with a differential of  syncope/seizures. Patient does follow-up with [facility] for seizures. Patient is also on Topamax for headaches    History of convulsive seizures without loss of consciousness  -Patient follows up outpatient with neurology  -Continue seizure medication  -Neurology consulted      Syncope  -It appears patient had an episode of unresponsiveness lasting for 10 seconds when she stood up with therapy  -Possibly secondary to hypotension  -Monitor  -Cardiology consult appreciate  -Echo shows no critical findings    History of migraine  -Resume home medications after med rec is done    Diabetes mellitus  -Continue sliding scale insulin  -Blood sugars trend up restart insulin    History of COVID  -Not hypoxic.   Does not qualify for treatment    Congenital hydrocephalus status post  shunt    DVT Prophylaxis: Lovenox  Diet: ADULT DIET; Regular; 4 carb choices (60 gm/meal)  Code Status: Full Code    PT/OT Eval Status:    Discharge plan -monitor today. Discharge possibly tomorrow    The patient and / or the family were informed of the results of any tests, a time was given to answer questions, a plan was proposed and they agreed with plan. Discussed with consulting physicians, nursing and social work     The note was completed using EMR. Every effort was made to ensure accuracy; however, inadvertent computerized transcription errors may be present.        Andrzej Gutierrez MD

## 2022-01-17 NOTE — PROGRESS NOTES
Physical Therapy    Facility/Department: [address] MED SURG  Initial Assessment/Treatment Session    NAME: Jerzy Esquivel  : 1995  MRN: [id number]    Date of Service: 2022    Discharge Recommendations:  Patient would benefit from continued therapy after discharge,Continue to assess pending progress        Assessment   Body structures, Functions, Activity limitations: Decreased functional mobility ; Decreased balance;Decreased endurance  Assessment: Pt is a 31 y.o. F. with PMH including seizure disorder, and CVA at 2 yrs old, who was admitted  for COVID, syncopal episode at home. She presents pleasant and agreeable to evaluation, c/o pain throughout her body after falling down her steps d/t syncope. PT noted potential R ankle sprain. Pt demonstrated functional LE strength, but quickly experienced what appeared to be a syncopal episode when she stood from EOB this morning (tolerated ~ 10 s. standing, then became unresponsive, requiring assist to return to supine - BP in supine = 117/84). Recommend continued therapy to gradually progress OOB activity as able. Will continue to assess for D/C plan pending progress, but pt would likely return home with prior level of assist from family. Jerzy Esquivel scored a  on the AM-PAC short mobility form. If patient discharges prior to next session this note will serve as a discharge summary. Please see below for the latest assessment towards goals. Specific instructions for Next Treatment: Improve standing tolerance; ambulate  Prognosis: Good  Decision Making: Medium Complexity  History: see below  Exam: Strength; ROM; Balance  Clinical Presentation: Evolving  PT Education: Goals; General Safety;Gait Training;PT Role;Orientation;Plan of Care; Functional Mobility Training;Transfer Training; Injury Prevention  Barriers to Learning: Fatigue; Syncope; hx of memory loss  REQUIRES PT FOLLOW UP: Yes  Activity Tolerance  Activity Tolerance: Patient limited by fatigue;Treatment limited secondary to medical complications (free text)       Patient Diagnosis(es): There were no encounter diagnoses. has a past medical history of Diabetes ([facility] Seizures ([facility] and Stroke ([facility] has a past surgical history that includes brain surgery. Restrictions  Restrictions/Precautions  Restrictions/Precautions: Fall Risk,Isolation,Contact Precautions,Seizure  Position Activity Restriction  Other position/activity restrictions: Room air; Hx of seizures; CVA at 2 yrs old. Vision/Hearing  Vision: Impaired (Blind in L eye)  Hearing: Within functional limits       Subjective  General  Chart Reviewed: Yes  Additional Pertinent Hx: 31 y.o. female who presented to [facility] with what looks like altered mental status, apparently patient with seizure disorder, initially her mom went to the restroom found her unresponsive then became responsive came to emergency department work-up for seizure was undergone, then patient had what looks like syncopal episodes for less than a minute, patient at this time stating that she is having mild shortness of breath some cough nausea but no vomiting, minimal headache no blurry or double vision. Tested positive for COVID in emergency department. Response To Previous Treatment: Not applicable  Referring Practitioner: Dr. Carrasco Service  Referral Date : 01/16/22  Diagnosis: COVID; Seizure disorder; Possible syncopal episode  Follows Commands: Within Functional Limits  Subjective  Subjective: Pt c/o soreness after falling down the steps at home.   A&O x 4  Pain Screening  Patient Currently in Pain: Denies    Orientation  Orientation  Overall Orientation Status: Within Normal Limits     Social/Functional History  Social/Functional History  Lives With: Family (Mom, Dad, and Grandma)  Type of Home: House  Home Layout: Two level,Bed/Bath upstairs  Home Access: Stairs to enter with rails  Entrance Stairs - Number of Steps: 2 + full flight to 2nd floor bedroom  Bathroom Shower/Tub: Tub/Shower unit  Bathroom Toilet: Standard  Bathroom Equipment: Shower chair,Grab bars in shower  ADL Assistance:  (Mother reports pt needs prompts to complete self-care, but no physical assist.)  Homemaking Assistance:  (pt assists with some tasks)  Ambulation Assistance: Independent  Transfer Assistance: Independent  Active : No  Additional Comments: Hx of multiple falls over past 3 months d/t lightheadedness (often happens before seizures)    Objective     Observation/Palpation  Palpation: Pain along R medial and lateral malleoli  Edema: Swelling and brusing noted lateral aspect of R ankle (pt reportedly sprained her ankle when she experienced a syncopal episode on the steps at home, just PTA). AROM RLE (degrees)  RLE AROM: WFL  RLE General AROM: Hip Flex, Knee Flex/Ext, and Ankle PF/DF WFL  AROM LLE (degrees)  LLE AROM : WFL  LLE General AROM: Hip Flex, Knee Flex/Ext, and Ankle PF/DF [facility]    Strength RLE  Strength RLE: WFL  Comment: Hip Flex, knee Flex/Ext, and Ankle PF/DF WFL  Strength LLE  Strength LLE: WFL  Comment: Hip Flex, knee Flex/Ext, and Ankle PF/DF WFL     Tone RLE  RLE Tone: Normotonic  Tone LLE  LLE Tone: Normotonic  Motor Control  Gross Motor?: WFL     Bed mobility  Supine to Sit: Supervision  Sit to Supine: Moderate assistance     Transfers  Sit to Stand: Contact guard assistance  Stand to sit: Minimal Assistance; Moderate Assistance     Ambulation  Ambulation?: No  Stairs/Curb  Stairs?: No     Balance  Comments: Pt stood for ~ 10 s. with SBA. She then experienced what appeared to be a syncopal episode, requiring assist to sit back down at EOB, then assist to maintain trunk control while sitting. She was positioned in supine, then regained alertness, able to respond to questions/commands. BP in supine = 118/74.       Plan   Plan  Times per week: 2-3x  Specific instructions for Next Treatment: Improve standing tolerance; ambulate  Current Treatment Recommendations: Functional Mobility Training,Transfer Training,Gait Training,Stair training,Endurance Training,Balance Training,Neuromuscular Re-education,Home Exercise Program,Safety Education & Training,Patient/Caregiver Education & Training  Safety Devices  Type of devices: All fall risk precautions in place,Call light within reach,Bed alarm in place,Gait belt,Left in bed,Nurse notified  Restraints  Initially in place: No    AM-PAC Score  AM-PAC Inpatient Mobility Raw Score : 16 (01/17/22 1003)  AM-PAC Inpatient T-Scale Score : 40.78 (01/17/22 1003)  Mobility Inpatient CMS 0-100% Score: 54.16 (01/17/22 1003)  Mobility Inpatient CMS G-Code Modifier : CK (01/17/22 1003)          Goals  Short term goals  Time Frame for Short term goals: In 2-3 days pt will perform  Short term goal 1: Bed mobility (I)  Short term goal 2: Transfers (I)  Short term goal 3: Ambulation 48' (I)  Patient Goals   Patient goals : To return home when able       Therapy Time   Individual Concurrent Group Co-treatment   Time In 0925         Time Out 1005         Minutes 40         Timed Code Treatment Minutes: 25 Minutes   Evaluation time: 15 min.      Derian Cantu PT    Electronically signed by Derian Cantu, RALF [id number] on 1/17/2022 at 10:11 AM

## 2022-01-17 NOTE — ED NOTES
Report called to [facility] Eugenia ROSA on 4N and patient taken to unit per ER staff     Leslie Bella RN  01/16/22 4383

## 2022-01-17 NOTE — ACP (ADVANCE CARE PLANNING)
Advance Care Planning     Advance Care Planning Activator (Inpatient)  Conversation Note      Date of ACP Conversation: 1/17/2022     Conversation Conducted with: Patient with Decision Making Capacity    ACP Activator: Tiff Naveed, [id number] [address] Maker:     Current Designated Health Care Decision Maker:     Primary Decision Maker: Jason Carbone - Parent - [phone]    Primary Decision Maker: Jose Angel Downs - Parent - [phone]    Today we documented Decision Maker(s) consistent with Legal Next of Kin hierarchy. Care Preferences    Ventilation: \"If you were in your present state of health and suddenly became very ill and were unable to breathe on your own, what would your preference be about the use of a ventilator (breathing machine) if it were available to you? \"      Would the patient desire the use of ventilator (breathing machine)?: yes    \"If your health worsens and it becomes clear that your chance of recovery is unlikely, what would your preference be about the use of a ventilator (breathing machine) if it were available to you? \"     Would the patient desire the use of ventilator (breathing machine)?: unsure      Resuscitation  \"CPR works best to restart the heart when there is a sudden event, like a heart attack, in someone who is otherwise healthy. Unfortunately, CPR does not typically restart the heart for people who have serious health conditions or who are very sick. \"    \"In the event your heart stopped as a result of an underlying serious health condition, would you want attempts to be made to restart your heart (answer \"yes\" for attempt to resuscitate) or would you prefer a natural death (answer \"no\" for do not attempt to resuscitate)? \" yes       [] Yes   [] No   Educated Patient / Emile Kim regarding differences between Advance Directives and portable DNR orders.     Length of ACP Conversation in minutes: 4 min     Conversation Outcomes:  [x] ACP discussion completed  [] Existing advance directive reviewed with patient; no changes to patient's previously recorded wishes  [] New Advance Directive completed  [] Portable Do Not Rescitate prepared for Provider review and signature  [] POLST/POST/MOLST/MOST prepared for Provider review and signature      Follow-up plan:    [] Schedule follow-up conversation to continue planning  [] Referred individual to Provider for additional questions/concerns   [] Advised patient/agent/surrogate to review completed ACP document and update if needed with changes in condition, patient preferences or care setting    [x] This note routed to one or more involved healthcare providers        Paco Barrett RN, BSN,   [phone]  Electronically signed by Paco Barrett RN on 1/17/2022 at 9:36 AM

## 2022-01-17 NOTE — PROGRESS NOTES
Pt admitted to room room 4272 from ER. Pt alert and oriented x 4, VSS. Fall risk screening completed. Side rails padded for seizure precaution. Bed in low position and bed alarm on. Orientation to room performed and instructions were provided for call light system. Call light and bedside table within pt reach. Will continue to  monitor.

## 2022-01-17 NOTE — PROGRESS NOTES
4 Eyes Skin Assessment     NAME:  Tennis Fuel  YOB: 1995  MEDICAL RECORD NUMBER:  [id number]    The patient is being assess for  Admission    I agree that 2 RN's have performed a thorough Head to Toe Skin Assessment on the patient. ALL assessment sites listed below have been assessed. Areas assessed by both nurses:    Head, Face, Ears, Shoulders, Back, Chest, Arms, Elbows, Hands, Sacrum. Buttock, Coccyx, Ischium and Legs. Feet and Heels        Does the Patient have a Wound?  No noted wound(s)       Avery Prevention initiated:  NA   Wound Care Orders initiated:  NA    Pressure Injury (Stage 3,4, Unstageable, DTI, NWPT, and Complex wounds) if present place consult order under [de-identified] NA    New and Established Ostomies if present place consult order under : NA      Nurse 1 eSignature: Electronically signed by Brien Montague RN on 1/17/22 at 2:41 AM EST    **SHARE this note so that the co-signing nurse is able to place an eSignature**    Nurse 2 eSignature:Electronically signed by Pedro Landry on 1/17/2022 at 5:16 AM

## 2022-01-17 NOTE — PROGRESS NOTES
Pharmacy Medication Reconciliation Note     List of medications patient is currently taking is complete. Source of information:   1. Conversation with pt via telephone due to Covid restrictions   2. [facility] where she fills  3. Care Everywhere    Allergies   Allergen Reactions    Ancef [Cefazolin] Hives       Notes regarding home medications:   1. Added Humalog 75-25. Injects 50 units BID and Bydureon 2 mg weekly injection  2. Topamax taper started 1/3/22 by Neurologist. Current dose is 25 mg at HS. 3. Removed the oral diabetic meds. No longer taking.      Nanette Crawford [facility]  1/17/2022  1:45 PM

## 2022-01-17 NOTE — CONSULTS
Cardiac Electrophysiology Consultation     Date: 1/17/2022  Admit Date:  1/16/2022  Admission Diagnosis: Seizures ([facility] [address] [R56.9]     Reason for Consultation: syncope  Consult Requesting Physician: Rory Bhardwaj MD       History of Present Illness  Ama Patel is a 31y.o. year old female with past medical history significant for sinus tachycardia, seizure disorder, DM, ADHD and congenital hydrocephalus s/p  shunt who presented to the ED after a syncopal episode at home. She has been feeling ill for the last 6 days with nausea, diarrhea and poor PO intake only drinking liquids. She has had dizziness with standing \"my whole life\" but this has worsened since becoming ill. She denies any past history of hypotension, said her BP is typically \"normal.\" Yesterday, she was in the bathroom with diarrhea when she felt like she might vomit so she grabbed the trash can. She started to smell something odd and somehow ended up in the bath tub, she does not recall how but says this is different from her typical seizures. While in the ED she was getting out of bed to go to the bathroom when she again felt lightheaded, noticed a strange smell and felt like her whole body got heavy. She had to be lowered to the ground by staff. She again had a similar occurrence when getting out of bed with PT this morning. She denies any past episodes similar to these. She does admit to palpitations like her Silvia Kidney is beating out of my chest\" that do not necessarily occur with position changes. She has been in SR/ST on monitor since admission. She is Covid positive.       Past Medical History:   Diagnosis Date    Diabetes ([facility] [address]     Seizures ([facility] [address]     Stroke [facility])         Past Surgical History:   Procedure Laterality Date    BRAIN SURGERY      shunt       Current Outpatient Medications   Medication Instructions    glimepiride (AMARYL) 4 mg, Oral, DAILY BEFORE BREAKFAST    levETIRAcetam (KEPPRA) 1,500 mg, Oral, 2 TIMES DAILY    SITagliptin (JANUVIA) 25 mg, Oral, DAILY        Allergies   Allergen Reactions    Ancef [Cefazolin] Hives       Social History:   reports that she has never smoked. She has never used smokeless tobacco. She reports that she does not drink alcohol and does not use drugs. Family History:  family history is not on file.      Review of Systems:  · General: positive for syncope, negative for fever, chills   · Ophthalmic ROS: negative for eye pain or loss of vision  · ENT ROS: negative for headaches, sore throat, nasal drainage  · Respiratory: negative for cough, sputum, SOB  · Cardiovascular: positive for lightheaded when standing, palpitations, negative for chest pain  · Gastrointestinal: positive for nausea, diarrhea, no abdominal pain, vomiting  · Hematology: negative for bleeding, blood clots, bruising or jaundice  · Genito-Urinary:  negative for dysuria or incontinence  · Musculoskeletal: negative for joint swelling, muscle pain  · Neurological: negative for confusion, dizziness, headaches   · Psychiatric: negative anxiety, depression  · Dermatological: negative for rash    Medications:  Scheduled Meds:   levETIRAcetam  1,500 mg Oral BID    sodium chloride flush  5-40 mL IntraVENous 2 times per day    insulin lispro  0-6 Units SubCUTAneous TID WC    insulin lispro  0-3 Units SubCUTAneous Nightly    enoxaparin  30 mg SubCUTAneous BID      Continuous Infusions:   sodium chloride      dextrose       PRN Meds:.sodium chloride flush, sodium chloride, ondansetron **OR** ondansetron, polyethylene glycol, acetaminophen **OR** acetaminophen, glucose, dextrose, glucagon (rDNA), dextrose     Physical Examination:  Vitals:    01/17/22 0849   BP: 101/60   Pulse: 81   Resp: 18   Temp: 97.9 °F (36.6 °C)   SpO2: 97%        Intake/Output Summary (Last 24 hours) at 1/17/2022 1034  Last data filed at 1/16/2022 2340  Gross per 24 hour   Intake 300 ml   Output --   Net 300 ml     In: 300 [P.O.:300]  Out: -    Wt Readings from Last 3 Encounters:   22 233 lb 14.5 oz (106.1 kg)   21 223 lb (101.2 kg)   17 200 lb (90.7 kg)     Temp  Av.1 °F (36.7 °C)  Min: 97.9 °F (36.6 °C)  Max: 98.5 °F (36.9 °C)  Pulse  Av.2  Min: 79  Max: 96  BP  Min: 98/51  Max: 151/87  SpO2  Av %  Min: 96 %  Max: 100 %    · Telemetry: Sinus rhythm in the 70s. · Constitutional: Alert, in no acute distress. Appears stated age. · Head: Normocephalic and atraumatic. · Eyes: Conjunctivae normal. EOM are normal.   · Neck: Neck supple. No lymphadenopathy. No rigidity. No JVD present. · Cardiovascular: Normal rate, regular rhythm. No murmurs, rubs or gallops. No S3 or S4.  · Pulmonary/Chest: Clear breath sounds bilaterally. No crackles, wheezes or rhonchi. No respiratory accessory muscle use. · Abdominal: Soft. Normal bowel sounds present. No distension, No tenderness. · Musculoskeletal: No tenderness. No edema    · Lymphadenopathy: Has no cervical adenopathy. · Neurological: Alert and oriented. No gross deficits. · Skin: Skin is warm and dry. No rash, lesions, ulcerations noted. · Psychiatric: No anxiety nor agitation. Labs:  Reviewed. Recent Labs     22  1219   *   K 3.7      CO2 21   BUN 7   CREATININE 0.7     Recent Labs     22  1219   WBC 4.1   HGB 12.6   HCT 38.0   MCV 77.2*        Lab Results   Component Value Date    TROPONINI <0.01 2017     No results found for: BNP  Lab Results   Component Value Date    PROTIME 12.1 2017    INR 1.07 2017     No results found for: CHOL, HDL, TRIG    Diagnostic and imaging results reviewed. EC17  ST at 104 BPM. Non-specific ST-T wave changes. Echo: 22  Pending. Echo: 17  - Left ventricle: The cavity size was normal. Wall thickness was     normal. Systolic function was normal. The calculated ejection     fraction was in the range of 64% to 68%.  Wall motion was normal;     there were no regional wall motion abnormalities. Normal     diastolic function. The global longitudinal strain was -22%. - Inferior vena cava: The vessel was normal in size; the     respirophasic diameter changes were in the normal range (>= 50%);     findings are consistent with normal central venous pressure. Assessment & Plan:    Syncope     - likely secondary to orthostatic hypotension with episodes occurring with position changes, BP has been low at times and pt has had diarrhea and less intake than normal with pretty much only liquids for the last several days secondary to Covid    - had a witnessed episode of syncope with standing this morning with PT with no arrhythmias seen on the monitor   - check orthostatic VS   - echo ordered, had a normal echo in 2017 so doubt there would be anything significant, she has not structural heart disease that is known   - encouraged her to stay well hydrated, sit/lie down if symptomatic, can use compression stockings when up      Diarrhea   - secondary to Covid   - encouraged increased hydration      Covid    - care per primary    Seizure disorder   - neurology following, syncopal episodes seem different from typical seizures     Discussed with Dr. Macy Wynn.     SANDY Howell  [facility], [address] [place] [address]  Phone: [phone]  Fax: [phone]    Electronically signed by SANDY Sow - CNP on 1/17/2022 at 10:34 AM

## 2022-01-17 NOTE — CARE COORDINATION
INITIAL CASE MANAGEMENT ASSESSMENT    Unable to meet with patient due to isolation status. Call to patient's room, spoke with patient over the phone to assess possible discharge needs. Explained Case Management role/services. Living Situation: verified address, lives in a 2 story house with parents & grandma, 2 KATYA    Covid + date: 1/16/22    ADLs: independent     DME: glucometer, shower chair that she uses if she feels dizzy     Transportation: does not drive, one of her parents can take her home at dc     Medications: no barriers, uses [employer] in [facility]    PCP: Alina Odom, also follows with Dr. Celina Malik neurology    PLAN/COMMENTS: denies any needs, plans to return home at dc    CM provided contact information for patient or family to call with any questions. CM will follow and assist as needed.     Junie Palomino RN, BSN, Case Management  [phone]  Electronically signed by Junie Palomino RN on 1/17/2022 at 9:30 AM

## 2022-01-17 NOTE — PLAN OF CARE
Problem: Falls - Risk of:  Goal: Will remain free from falls  Description: Will remain free from falls  1/17/2022 1607 by Shawn Mallory RN  Outcome: Ongoing  1/17/2022 0215 by Grace Zuluaga RN  Outcome: Ongoing  Goal: Absence of physical injury  Description: Absence of physical injury  1/17/2022 1607 by Shawn Mallory RN  Outcome: Ongoing  1/17/2022 0215 by Grace Zuluaga RN  Outcome: Ongoing     Problem: Airway Clearance - Ineffective  Goal: Achieve or maintain patent airway  1/17/2022 1607 by Shawn Mallory RN  Outcome: Ongoing  1/17/2022 0215 by Grace Zuluaga RN  Outcome: Ongoing     Problem: Gas Exchange - Impaired  Goal: Absence of hypoxia  1/17/2022 1607 by Shawn Mallory RN  Outcome: Ongoing  1/17/2022 0215 by Grace Zuluaga RN  Outcome: Ongoing  Goal: Promote optimal lung function  1/17/2022 1607 by Shawn Mallory RN  Outcome: Ongoing  1/17/2022 0215 by Grace Zuluaga RN  Outcome: Ongoing     Problem: Breathing Pattern - Ineffective  Goal: Ability to achieve and maintain a regular respiratory rate  1/17/2022 1607 by Shawn Mallory RN  Outcome: Ongoing  1/17/2022 0215 by Grace Zuluaga RN  Outcome: Ongoing     Problem:  Body Temperature -  Risk of, Imbalanced  Goal: Ability to maintain a body temperature within defined limits  1/17/2022 1607 by Shawn Mallory RN  Outcome: Ongoing  1/17/2022 0215 by Grace Zuluaga RN  Outcome: Ongoing  Goal: Will regain or maintain usual level of consciousness  1/17/2022 1607 by Shawn Mallory RN  Outcome: Ongoing  1/17/2022 0215 by Grace Zuluaga RN  Outcome: Ongoing  Goal: Complications related to the disease process, condition or treatment will be avoided or minimized  1/17/2022 1607 by Shawn Mallory RN  Outcome: Ongoing  1/17/2022 0215 by Grace Zuluaga RN  Outcome: Ongoing     Problem: Isolation Precautions - Risk of Spread of Infection  Goal: Prevent transmission of infection  1/17/2022 1607 by Shawn Mallory RN  Outcome: Ongoing  1/17/2022 0215 by Grace Zuluaga RN  Outcome: Ongoing     Problem: Nutrition Deficits  Goal: Optimize nutritional status  1/17/2022 1607 by Christine Ramos RN  Outcome: Ongoing  1/17/2022 0215 by Erby Hodgkin, RN  Outcome: Ongoing     Problem: Risk for Fluid Volume Deficit  Goal: Maintain normal heart rhythm  1/17/2022 1607 by Christine Ramos RN  Outcome: Ongoing  1/17/2022 0215 by Erby Hodgkin, RN  Outcome: Ongoing  Goal: Maintain absence of muscle cramping  1/17/2022 1607 by Christine Ramos RN  Outcome: Ongoing  1/17/2022 0215 by Erby Hodgkin, RN  Outcome: Ongoing  Goal: Maintain normal serum potassium, sodium, calcium, phosphorus, and pH  1/17/2022 1607 by Christine Ramos RN  Outcome: Ongoing  1/17/2022 0215 by Erby Hodgkin, RN  Outcome: Ongoing     Problem: Loneliness or Risk for Loneliness  Goal: Demonstrate positive use of time alone when socialization is not possible  1/17/2022 1607 by Christine Ramos RN  Outcome: Ongoing  1/17/2022 0215 by Erby Hodgkin, RN  Outcome: Ongoing     Problem: Fatigue  Goal: Verbalize increase energy and improved vitality  1/17/2022 1607 by Christine Ramos RN  Outcome: Ongoing  1/17/2022 0215 by Erby Hodgkin, RN  Outcome: Ongoing     Problem: Patient Education: Go to Patient Education Activity  Goal: Patient/Family Education  1/17/2022 1607 by Christine Ramos RN  Outcome: Ongoing  1/17/2022 0215 by Erby Hodgkin, RN  Outcome: Ongoing

## 2022-01-17 NOTE — PLAN OF CARE
Problem: Falls - Risk of:  Goal: Will remain free from falls  Description: Will remain free from falls  Outcome: Ongoing  Goal: Absence of physical injury  Description: Absence of physical injury  Outcome: Ongoing     Problem: Isolation Precautions - Risk of Spread of Infection  Goal: Prevent transmission of infection  Outcome: Ongoing     Problem: Nutrition Deficits  Goal: Optimize nutritional status  Outcome: Ongoing     Problem: Risk for Fluid Volume Deficit  Goal: Maintain normal heart rhythm  Outcome: Ongoing  Goal: Maintain absence of muscle cramping  Outcome: Ongoing  Goal: Maintain normal serum potassium, sodium, calcium, phosphorus, and pH  Outcome: Ongoing     Problem: Loneliness or Risk for Loneliness  Goal: Demonstrate positive use of time alone when socialization is not possible  Outcome: Ongoing     Problem: Patient Education: Go to Patient Education Activity  Goal: Patient/Family Education  Outcome: Ongoing     Problem: Fatigue  Goal: Verbalize increase energy and improved vitality  Outcome: Ongoing

## 2022-01-17 NOTE — CONSULTS
Neurology Consult Note  Reason for Consult: seizure    Chief complaint: been feeling sick     Dr Lor Navarrete MD asked me to see Taylor Miranda in consultation for evaluation of seizure    History of Present Illness:  Taylor Miranda is a 31 y.o. female who presents with loss of consciousness. I obtained my information via interview w/ the patient, supplemented by chart review. The patient has a hx of congenital hydrocephalus s/p  shunt, headaches, and seizure disorder. She follows w/ [facility] and says she is prescribed 1500 mg of Keppra BID for seizure control. She tells me that since Tuesdays she has been feeling ill. She had been having headaches and feeling very lethargic/sleepy. The light was very bothersome to her. She suggests her intake has been OK. Two days ago her mother was diagnosed w/ COVID, her father yesterday. Yesterday she had an episode where she had just eaten and was in the bathroom when she abruptly started to smell something odd. She felt like she was going to vomit. Apparently she was then found in the bathtub on her knees incontinent of stool. No tongue biting. She is not entirely sure how long she was unconscious. Her mom and dad came to her aid and got her out of the tub and onto the bathroom floor. She can recall at that point being sort of in and out of consciousness and her dad trying to keep her head up. EMS was called and she was subsequently transported to the ED to be evaluated. CT head was stable, no obvious acute findings. She is COVID positive. Keppra level was undetectable and she was loaded w/ 1g.      While in the ED she reportedly was helped out of bed to us the bathroom and again lost consciousness. There were no reports of any seizure activity related to his episode.       When working w/ PT just prior to my encounter, she again had a brief episode when up from bed where she sort of slumped over, quickly regaining consciousness. Currently her primary complaint is feeling dizzy/spinning w/ movement. Of note, she suggest to me that her typical seizure episodes consist of generalized convulsive behavior w/o altered awareness. She was diagnosed in 2014 following cvEEG at [facility] w/ [facility]. She re-established w/ Dr. Myesha Aburto this past July. Medical History:  Past Medical History:   Diagnosis Date    [facility] ([facility] [address]     Seizures ([facility] [address]     Stroke [facility])      Past Surgical History:   Procedure Laterality Date    BRAIN SURGERY      shunt     Scheduled Meds:   levETIRAcetam  1,500 mg Oral BID    sodium chloride flush  5-40 mL IntraVENous 2 times per day    insulin lispro  0-6 Units SubCUTAneous TID WC    insulin lispro  0-3 Units SubCUTAneous Nightly    enoxaparin  30 mg SubCUTAneous BID     Medications Prior to Admission:   levETIRAcetam (KEPPRA) 500 MG tablet, Take 1,500 mg by mouth 2 times daily   SITagliptin (JANUVIA) 25 MG tablet, Take 25 mg by mouth daily  glimepiride (AMARYL) 4 MG tablet, Take 4 mg by mouth every morning (before breakfast)    Allergies   Allergen Reactions    Ancef [Cefazolin] Hives     History reviewed. No pertinent family history. Social History     Tobacco Use   Smoking Status Never Smoker   Smokeless Tobacco Never Used     Social History     Substance and Sexual Activity   Drug Use No     Social History     Substance and Sexual Activity   Alcohol Use No     ROS  Constitutional- No weight loss or fevers  Eyes- No diplopia. No photophobia. Ears/nose/throat- No dysphagia. No Dysarthria  Cardiovascular- No palpitations. No chest pain  Respiratory- No dyspnea. No Cough  Gastrointestinal- No Abdominal pain. No Vomiting. Genitourinary- No incontinence. No urinary retention  Musculoskeletal- No myalgia. No arthralgia  Skin- No rash. No easy bruising. Psychiatric- No depression. No anxiety  Endocrine- No diabetes. No thyroid issues. Hematologic- No bleeding difficulty.  No fatigue  Neurologic- No weakness. No Headache. Exam  Blood pressure 101/60, pulse 81, temperature 97.9 °F (36.6 °C), temperature source Oral, resp. rate 18, height 5' 7\" (1.702 m), weight 233 lb 14.5 oz (106.1 kg), last menstrual period 12/26/2021, SpO2 97 %. Constitutional    Vital signs: BP, HR, and RR reviewed   General alert, no distress, well-nourished  Eyes: unable to visualize the fundi  Cardiovascular: pulses symmetric in all 4 extremities. No peripheral edema. Psychiatric: cooperative with examination, no  psychotic behavior noted. Neurologic  Mental status:   orientation to person and place   General fund of knowledge grossly intact   Memory grossly intact   Attention intact as able to attend well to the exam     Language fluent in conversation   Comprehension intact; follows simple commands  Cranial nerves:   CN2: visual fields full. CN 3,4,6: extraocular muscles intact. Pupils are equal, round, reactive bilaterally. CN5: facial sensation symmetric   CN7: face symmetric without dysarthria  CN8: hearing grossly intact  CN9: palate elevated symmetrically  CN11: trap full strength on shoulder shrug  CN12: tongue midline with protrusion  Strength: good strength in all 4 extremities   Sensory: light touch intact in all 4 extremities. Cerebellar/coordination: finger nose finger normal without ataxia  Tone: normal in all 4 extremities  Gait: deferred at this time for safety. Labs  Keppra < 2.0    Glucose 157  Na 135  K 3.7  BUN 7  Cr 0.7    ALT 48  AST 40    WBC 4.1K  Hg 12.6  Platelets 459    HCG < 5.0    COVID +    Studies  CT head w/o 1/16/22, independently reviewed  No acute intracranial abnormality. Right frontal ventriculostomy catheter is in stable position, with stable   decompression of the ventricles. Minimal inflammatory disease of the paranasal sinuses, including the   sphenoids.      XR shunt series 1/16/22  Right frontal ventriculostomy catheter appears intact.  Portion of the mid   chest is difficult to visualize, related to large body habitus. Impression  1.  Episodes of LOC. Seems to be more consistent w/ syncope in the setting of COVID. She has a questionable hx of seizures, actually diagnosed w/ [facility] via cvEEG monitoring back in 2014. Her description of her seizures (generalized convulsive behavior w/out loss of awareness) would be consistent w/ that diagnosis. Theoretically patients can have both epileptic and non epileptic events, however. She tells she she has been maintained on Keppra and is complaint though her level is undetectable. 2.  COVID. 3.  Congenital hydrocephalus s/p  shunt. 4.  Dizziness. 5.  Migraines. Recommendations  1. Continue home dose of Keppra. She will need to follow up w/ her outpatient Neurologist for further workup and management of her spells. 2.  Would check orthostatic BP/HR w/ regards to her positional dizziness. Cardiology is involved.     Morenita Taylor NP  [facility] [address] Neurology    A copy of this note was provided for Dr Queta Sanchez MD

## 2022-01-18 VITALS
OXYGEN SATURATION: 96 % | TEMPERATURE: 98.3 F | SYSTOLIC BLOOD PRESSURE: 98 MMHG | DIASTOLIC BLOOD PRESSURE: 65 MMHG | BODY MASS INDEX: 36.71 KG/M2 | HEART RATE: 71 BPM | HEIGHT: 67 IN | WEIGHT: 233.91 LBS | RESPIRATION RATE: 18 BRPM

## 2022-01-18 LAB
ANION GAP SERPL CALCULATED.3IONS-SCNC: 13 MMOL/L (ref 3–16)
BUN BLDV-MCNC: 14 MG/DL (ref 7–20)
C DIFF TOXIN/ANTIGEN: NORMAL
CALCIUM SERPL-MCNC: 8.5 MG/DL (ref 8.3–10.6)
CHLORIDE BLD-SCNC: 105 MMOL/L (ref 99–110)
CO2: 19 MMOL/L (ref 21–32)
CREAT SERPL-MCNC: 0.5 MG/DL (ref 0.6–1.1)
GFR AFRICAN AMERICAN: >60
GFR NON-AFRICAN AMERICAN: >60
GLUCOSE BLD-MCNC: 144 MG/DL (ref 70–99)
GLUCOSE BLD-MCNC: 158 MG/DL (ref 70–99)
GLUCOSE BLD-MCNC: 160 MG/DL (ref 70–99)
GLUCOSE BLD-MCNC: 170 MG/DL (ref 70–99)
PERFORMED ON: ABNORMAL
POTASSIUM SERPL-SCNC: 3.6 MMOL/L (ref 3.5–5.1)
SODIUM BLD-SCNC: 137 MMOL/L (ref 136–145)

## 2022-01-18 PROCEDURE — 6370000000 HC RX 637 (ALT 250 FOR IP): Performed by: INTERNAL MEDICINE

## 2022-01-18 PROCEDURE — 94760 N-INVAS EAR/PLS OXIMETRY 1: CPT

## 2022-01-18 PROCEDURE — 36415 COLL VENOUS BLD VENIPUNCTURE: CPT

## 2022-01-18 PROCEDURE — 97165 OT EVAL LOW COMPLEX 30 MIN: CPT

## 2022-01-18 PROCEDURE — 6370000000 HC RX 637 (ALT 250 FOR IP): Performed by: NURSE PRACTITIONER

## 2022-01-18 PROCEDURE — 97530 THERAPEUTIC ACTIVITIES: CPT

## 2022-01-18 PROCEDURE — 87324 CLOSTRIDIUM AG IA: CPT

## 2022-01-18 PROCEDURE — 6360000002 HC RX W HCPCS: Performed by: INTERNAL MEDICINE

## 2022-01-18 PROCEDURE — 87449 NOS EACH ORGANISM AG IA: CPT

## 2022-01-18 PROCEDURE — 80048 BASIC METABOLIC PNL TOTAL CA: CPT

## 2022-01-18 PROCEDURE — 97535 SELF CARE MNGMENT TRAINING: CPT

## 2022-01-18 RX ADMIN — INSULIN LISPRO 1 UNITS: 100 INJECTION, SOLUTION INTRAVENOUS; SUBCUTANEOUS at 08:30

## 2022-01-18 RX ADMIN — LEVETIRACETAM 1500 MG: 500 TABLET, FILM COATED ORAL at 08:47

## 2022-01-18 RX ADMIN — ONDANSETRON 4 MG: 2 INJECTION INTRAMUSCULAR; INTRAVENOUS at 08:47

## 2022-01-18 RX ADMIN — ENOXAPARIN SODIUM 30 MG: 100 INJECTION SUBCUTANEOUS at 08:47

## 2022-01-18 RX ADMIN — INSULIN LISPRO 1 UNITS: 100 INJECTION, SOLUTION INTRAVENOUS; SUBCUTANEOUS at 12:31

## 2022-01-18 NOTE — PROGRESS NOTES
Progress Note  Admit Date: 1/16/2022      PCP: Gume Saucedo     CC: F/U for seizure/ syncope    Days in hospital:  2    SUBJECTIVE / Interval History:  Patient feels okay. No complaints at present  Blood pressure on the lower side      Allergies  Ancef [cefazolin]    Medications    Scheduled Meds:   levETIRAcetam  1,500 mg Oral BID    topiramate  25 mg Oral Nightly    sodium chloride flush  5-40 mL IntraVENous 2 times per day    insulin lispro  0-6 Units SubCUTAneous TID WC    insulin lispro  0-3 Units SubCUTAneous Nightly    enoxaparin  30 mg SubCUTAneous BID     Continuous Infusions:   sodium chloride      dextrose         PRN Meds:  sodium chloride flush, sodium chloride, ondansetron **OR** ondansetron, polyethylene glycol, acetaminophen **OR** acetaminophen, glucose, dextrose, glucagon (rDNA), dextrose    Vitals    BP 98/65   Pulse 71   Temp 98.3 °F (36.8 °C) (Oral)   Resp 18   Ht 5' 7\" (1.702 m)   Wt 233 lb 14.5 oz (106.1 kg)   LMP 12/26/2021 (Approximate)   SpO2 97%   BMI 36.64 kg/m²     Exam:    Gen: No distress. Eyes: PERRL. No sclera icterus. No conjunctival injection. ENT: No discharge. Pharynx clear. External appearance of ears and nose normal.  Neck: Trachea midline. No obvious mass. Resp: No accessory muscle use. No crackles. No wheezes. No rhonchi. No dullness on percussion. CV: Regular rate. Regular rhythm. No murmur or rub. No edema. GI: Non-tender. Non-distended. No hernia. Skin: Warm, dry, normal texture and turgor. No nodule on exposed extremities. Lymph: No cervical LAD. No supraclavicular LAD. M/S: No cyanosis. No clubbing. No joint deformity. Neuro: Moves all four extremities. CN 2-12 tested, no defect noted. Psych: Oriented x 3. No anxiety. Awake. Alert. Intact judgement and insight.     Data    LABS  CBC:   Recent Labs     01/16/22  1219 01/17/22  1007   WBC 4.1 3.1*   HGB 12.6 12.2   HCT 38.0 36.3   MCV 77.2* 76.9*    200     BMP:   Recent Labs 01/17/22  1007 01/17/22  1041 01/18/22  0646    137 137   K 3.8 3.5 3.6    105 105   CO2 20* 21 19*   BUN 11 10 14   CREATININE 0.5* 0.6 0.5*   GLUCOSE 165* 171* 160*     POC GLUCOSE:    Recent Labs     01/17/22  0815 01/17/22  1135 01/17/22  1708 01/17/22  2214 01/18/22  0619   POCGLU 157* 175* 150* 166* 170*     LIVER PROFILE:   Recent Labs     01/16/22  1219 01/17/22  1007   AST 40* 32   ALT 48* 42*   LABALBU 4.1 3.9   BILITOT <0.2 0.4   ALKPHOS 70 61     PT/INR: No results for input(s): PROTIME, INR in the last 72 hours. APTT: No results for input(s): APTT in the last 72 hours. UA:No results for input(s): NITRITE, COLORU, PHUR, LABCAST, WBCUA, RBCUA, MUCUS, TRICHOMONAS, YEAST, BACTERIA, CLARITYU, SPECGRAV, LEUKOCYTESUR, UROBILINOGEN, BILIRUBINUR, BLOODU, GLUCOSEU, KETUA, AMORPHOUS in the last 72 hours. Microbiology:  Wound Culture: No results for input(s): WNDABS, ORG in the last 72 hours. Invalid input(s):  LABGRAM  Nasal Culture: No results for input(s): ORG, MRSAPCR in the last 72 hours. Blood Culture: No results for input(s): BC, BLOODCULT2 in the last 72 hours. Fungal Culture:   No results for input(s): FUNGSM in the last 72 hours. No results for input(s): FUNCXBLD in the last 72 hours. CSF Culture:  No results for input(s): COLORCSF, APPEARCSF, CFTUBE, CLOTCSF, WBCCSF, RBCCSF, NEUTCSF, NUMCELLSCSF, LYMPHSCSF, MONOCSF, GLUCCSF, VOLCSF in the last 72 hours. Respiratory Culture:  No results for input(s): [place] in the last 72 hours. AFB:No results for input(s): AFBSMEAR in the last 72 hours. Urine Culture  No results for input(s): LABURIN in the last 72 hours. RADIOLOGY:    CT HEAD WO CONTRAST   Final Result   Head-      No acute intracranial abnormality. Right frontal ventriculostomy catheter is in stable position, with stable   decompression of the ventricles. Minimal inflammatory disease of the paranasal sinuses, including the   sphenoids.       --- Cervical spine-      Straightening of the cervical spine. No acute osseous abnormality or significant spondylosis. CT CERVICAL SPINE WO CONTRAST   Final Result   Head-      No acute intracranial abnormality. Right frontal ventriculostomy catheter is in stable position, with stable   decompression of the ventricles. Minimal inflammatory disease of the paranasal sinuses, including the   sphenoids. ---      Cervical spine-      Straightening of the cervical spine. No acute osseous abnormality or significant spondylosis. XR SHUNT SERIES PLACEMENT (<4 VIEWS)   Final Result   Right frontal ventriculostomy catheter appears intact. Portion of the mid   chest is difficult to visualize, related to large body habitus. RECOMMENDATION:             CONSULTS:    IP CONSULT TO NEUROLOGY  IP CONSULT TO NEUROLOGY  IP CONSULT TO NEUROLOGY  IP CONSULT TO CARDIOLOGY    ASSESSMENT AND PLAN:      Active Problems:    Syncope and collapse    Seizure ([address]    COVID-19    Seizures ([facility])  Resolved Problems:    * No resolved hospital problems. *    Patient is a 48-year-old female with a past medical history of ADHD, congenital hydrocephalus, epilepsy, metabolic syndrome, hyperlipidemia, diabetes who presented after she was found unresponsive in her tub. Mom noticed patient's eyes rolling and she was admitted with a differential of  syncope/seizures. Patient does follow-up with [facility] for seizures. Patient is also on Topamax for headaches. Patient was admitted with seizure and syncope. She was found to have COVID which could have triggered her seizures.     History of convulsive seizures without loss of consciousness  -Patient follows up outpatient with neurology  -Continue seizure medication  -Neurology consulted, consult appreciated      Syncope, blood pressure on the lower side  -It appears patient had an episode of unresponsiveness lasting for 10 seconds when she stood up with therapy  -Possibly secondary to hypotension  -Monitor  -Cardiology consult appreciate  -Echo shows no critical findings    History of migraine  -Resume home medications after med rec is done    Diabetes mellitus-patient has been counseled to have her regular diet. She states she has been having her liquids since she has had COVID. Explained to the patient that all insulin will need to be decreased if her p.o. intake is poor. Patient understands  -Continue sliding scale insulin  -Blood sugars trend up restart insulin    History of COVID  -Not hypoxic. Does not qualify for treatment    Congenital hydrocephalus status post  shunt    DVT Prophylaxis: Lovenox  Diet: ADULT DIET; Regular; 4 carb choices (60 gm/meal)  Code Status: Full Code    PT/OT Eval Status:    Discharge plan -monitor today. Discharge possibly tomorrow    The patient and / or the family were informed of the results of any tests, a time was given to answer questions, a plan was proposed and they agreed with plan. Discussed with consulting physicians, nursing and social work     The note was completed using EMR. Every effort was made to ensure accuracy; however, inadvertent computerized transcription errors may be present.        Aftab Davidson MD

## 2022-01-18 NOTE — PROGRESS NOTES
Pt educated on discharge information. Pt states no further questions. Pt IV removed with no complications. Pt escorted to lobby to be picked up by mother.     Electronically signed by Jordi Driver RN on 1/18/22 at 2:18 PM EST

## 2022-01-18 NOTE — PROGRESS NOTES
Physical Therapy  Facility/Department: [address] MED SURG  Daily Treatment Note  NAME: Esther Bah  : 1995  MRN: [id number]    Date of Service: 2022    Discharge Recommendations:  Home with assist PRN        Assessment   Body structures, Functions, Activity limitations: Decreased functional mobility ; Decreased balance;Decreased endurance  Assessment: Pt states she slept a lot yesterday, feels better today, and is anxious to go home. She was able to ambulate to/from commode with supervision, then an additional 76' in room without assist (supervision), no c/o dizziness, no LOB. She completed static standing trial x 2 min., and march in place x 1 min. without dizziness or LOB. She appears to be functioning near her baseline for mobility tasks, and will have assist available as needed from family upon D/C. Anticipate safe return home. No DME or home PT needed. Esther Bah scored a 20/24 on the AM-PAC short mobility form. If patient discharges prior to next session this note will serve as a discharge summary. Please see below for the latest assessment towards goals. Specific instructions for Next Treatment: Improve standing tolerance; ambulate  PT Education: Goals; General Safety;Gait Training;PT Role;Orientation;Plan of Care; Functional Mobility Training;Transfer Training; Injury Prevention  REQUIRES PT FOLLOW UP: Yes  Activity Tolerance  Activity Tolerance: Patient Tolerated treatment well     Patient Diagnosis(es): There were no encounter diagnoses. has a past medical history of Diabetes ([facility] Seizures ([facility] and Stroke ([facility] has a past surgical history that includes brain surgery. Restrictions  Restrictions/Precautions  Restrictions/Precautions: Fall Risk,Isolation,Contact Precautions,Seizure  Position Activity Restriction  Other position/activity restrictions: Room air; Hx of seizures; CVA at 2 yrs old. Subjective   General  Chart Reviewed:  Yes  Additional Pertinent Hx: 31 y.o. female who presented to [facility] with what looks like altered mental status, apparently patient with seizure disorder, initially her mom went to the restroom found her unresponsive then became responsive came to emergency department work-up for seizure was undergone, then patient had what looks like syncopal episodes for less than a minute, patient at this time stating that she is having mild shortness of breath some cough nausea but no vomiting, minimal headache no blurry or double vision. Tested positive for COVID in emergency department. Response To Previous Treatment: Patient with no complaints from previous session. Subjective  Subjective: Pt reports getting a lot of sleep yesterday. Has been getting up to the bathroom on her own. Denies any additional episodes of syncope/seizure. Orientation  Orientation  Overall Orientation Status: Within Normal Limits    Objective      Bed mobility  Supine to Sit: Independent  Sit to Supine: Independent     Transfers  Sit to Stand: Supervision  Stand to sit: Supervision     Ambulation  Ambulation?: Yes  Ambulation 1  Surface: level tile  Device: No Device  Assistance: Supervision  Quality of Gait: Slow speed, erect posture, step-through pattern, no c/o dizziness, no LOB. Distance: 10' x 2, 76'     Balance  Comments: Unsupported static stand x 2 min., no LOB, no dizziness. March in place x 1 min., no LOB, no dizziness, mild fatigue reported. AM-PAC Score  AM-PAC Inpatient Mobility Raw Score : 20 (01/18/22 0848)  AM-PAC Inpatient T-Scale Score : 47.67 (01/18/22 0848)  Mobility Inpatient CMS 0-100% Score: 35.83 (01/18/22 0848)  Mobility Inpatient CMS G-Code Modifier : CJ (01/18/22 0848)          Goals  Short term goals  Time Frame for Short term goals:  In 2-3 days pt will perform  Short term goal 1: Bed mobility (I) - met 1/18  Short term goal 2: Transfers (I) - ongoing 1/18  Short term goal 3: Ambulation 48' (I) - ongoing 1/18  Patient Goals   Patient goals : To return home when able    Plan    Plan  Times per week: 2-3x  Specific instructions for Next Treatment: Improve standing tolerance; ambulate  Current Treatment Recommendations: Functional Mobility Training,Transfer Training,Gait Training,Stair training,Endurance Training,Balance Training,Neuromuscular Re-education,Home Exercise Program,Safety Education & Training,Patient/Caregiver Education & Training  Safety Devices  Type of devices:  All fall risk precautions in place,Call light within reach,Left in bed  Restraints  Initially in place: No     Therapy Time   Individual Concurrent Group Co-treatment   Time In 0830         Time Out 0840         Minutes 10         Timed Code Treatment Minutes: 2157 Cristian Perez, PT    Electronically signed by Candace Guaman PT [id number] on 1/18/2022 at 8:49 AM

## 2022-01-18 NOTE — DISCHARGE SUMMARY
Hospital Medicine Discharge Summary      Patient ID: Javy Umanzor      Patient's PCP: Sarah Aguirre Date: 1/16/2022     Discharge Date: 1/18/2022  The patient was seen and examined on day of discharge and this discharge summary is in conjunction with any daily progress note from day of discharge. Admitting Physician: Valencia Pierre MD    Discharge Physician: Werner Dean MD     Admitted for   Chief Complaint   Patient presents with    Seizures       Admitting Diagnosis Seizures ([address] [R56.9]    Discharge Diagnoses: Active Hospital Problems    Diagnosis Date Noted    Syncope and collapse [R55] 01/16/2022    Seizure ([address] [R56.9] 01/16/2022    COVID-19 [U07.1] 01/16/2022    Seizures ([address] [R56.9] 01/16/2022       Follow Up: Primary Care Physician in one week    PCP to Follow up on   Post discharge follow-up          Hospital Course:     Patient is a 54-year-old female with a past medical history of ADHD, congenital hydrocephalus, epilepsy, metabolic syndrome, hyperlipidemia, diabetes who presented after she was found unresponsive in her tub. Mom noticed patient's eyes rolling and she was admitted with a differential of  syncope/seizures. Patient does follow-up with [facility] for seizures. Patient is also on Topamax for headaches. Patient was admitted with seizure and syncope.   She was found to have COVID which could have triggered her seizures.     History of convulsive seizures without loss of consciousness  -Patient follows up outpatient with neurology  -Continue seizure medication  -Neurology consulted, consult appreciated        Syncope, blood pressure on the lower side  -It appears patient had an episode of unresponsiveness lasting for 10 seconds when she stood up with therapy  -Possibly secondary to hypotension  -Monitor  -Cardiology consult appreciate  -Echo shows no critical findings     History of migraine  -Resume home medications after med rec is counseling and review of medications and discharge plan. Signed:  Shai Rodriguez MD   1/18/2022    The note was completed using EMR. Every effort was made to ensure accuracy; however, inadvertent computerized transcription errors may be present. Thank you Yas Fink for the opportunity to be involved in this patient's care. If you have any questions or concerns please feel free to contact me at [phone].

## 2022-01-18 NOTE — CARE COORDINATION
DISCHARGE SUMMARY     DATE OF DISCHARGE: 1/18/2022    DISCHARGE DESTINATION: Home with family    FACILITY: None    TRANSPORTATION:   Name: Kimberly Pope  Relationship: Mother  Sarah Og up Time: TBD by patient, family and/or medical staff. Phone Number: [phone]    COMMENTS: SW spoke to patient via telephone and she stated that she was being picked up within an hour or so by her mother. She agrees with the discharge and denies needs including oxygen, medication assistance or a work note. No further needs noted unless indicated by patient, family and/or medical staff. Respectfully submitted,    AISHWARYA Valentin  [facility]   [phone]    Electronically signed by AISHWARYA Mack on 1/18/2022 at 11:51 AM

## 2022-01-18 NOTE — PROGRESS NOTES
Occupational Therapy   Occupational Therapy Initial Assessment/Discharge   Date: 2022   Patient Name: Amilcar Rasmussen  MRN: [id number]     : 1995    Date of Service: 2022    Discharge Recommendations:  24 hour supervision or assist       Assessment   Assessment: Pt is a 26yr old female admitted for syncope and possible seizure activity. Pt also found to be COVID +. Pt reports baseline independence with ADLs and mobility. Today, pt reports feeling back to baseline. Pt able to complete simple ADLs in bathroom with ind-supervision. Pt denies any OT concerns at this time. Anticipate safe d/c home with 24hr supervision. Prognosis: Good  Decision Making: Low Complexity  OT Education: OT Role;ADL Adaptive Strategies;Precautions;Plan of Care;Transfer Training;Orientation  No Skilled OT: Safe to return home; Independent with ADL's;At baseline function  REQUIRES OT FOLLOW UP: Yes  Activity Tolerance  Activity Tolerance: Patient Tolerated treatment well  Activity Tolerance: Supine BP- 97/63; seated BP- 104/71; Pt denies dizziness throughout evaluation  Safety Devices  Safety Devices in place: Yes  Type of devices: All fall risk precautions in place; Left in bed;Call light within reach;Nurse notified; Bed alarm in place; Patient at risk for falls           Patient Diagnosis(es):    has a past medical history of Diabetes ([facility] Seizures ([facility] and Stroke ([facility] has a past surgical history that includes brain surgery. Restrictions  Restrictions/Precautions  Restrictions/Precautions: Fall Risk,Isolation,Contact Precautions,Seizure  Position Activity Restriction  Other position/activity restrictions: Room air; Hx of seizures; CVA at 2 yrs old.     Subjective   General  Chart Reviewed: Stefanie Power and Physical  Patient assessed for rehabilitation services?: Yes  Additional Pertinent Hx: Hx of seizures; hx of CVA at 1yrs old  Family / Caregiver Present: No  Referring Practitioner: Jean-Paul Martinez MD  Diagnosis: Syncope Episodes  Subjective  Subjective: Pt in bed, reporting \" feeling much better\". Pt states she has been getting up to use the bathroom independently. Patient Currently in Pain: Denies  Vital Signs  Patient Currently in Pain: Denies  Social/Functional History  Social/Functional History  Lives With: Family (Mom, Dad, and Grandma)  Type of Home: House  Home Layout: Two level,Bed/Bath upstairs,1/2 bath on main level  Home Access: Stairs to enter with rails  Entrance Stairs - Number of Steps: 2 + full flight to 2nd floor bedroom  Bathroom Shower/Tub: Tub/Shower unit  Bathroom Toilet: Standard  Bathroom Equipment: Shower chair,Grab bars in shower  ADL Assistance:  (Mother reports pt needs prompts to complete self-care, but no physical assist.)  Homemaking Assistance:  (pt assists with some tasks)  Ambulation Assistance: Independent  Transfer Assistance: Independent  Active : No  Additional Comments: Hx of multiple falls over past 3 months d/t lightheadedness (often happens before seizures)       Objective        Orientation  Overall Orientation Status: Within Functional Limits  Observation/Palpation  Posture: Good  Balance  Sitting Balance: Independent  Standing Balance: Supervision  Functional Mobility  Functional - Mobility Device: No device  Activity: To/from bathroom  Assist Level: Supervision  Toilet Transfers  Toilet - Technique: Ambulating  Equipment Used: Standard toilet  Toilet Transfer: Independent  ADL  Feeding: Independent  Grooming: Independent (hand hygiene at sink)  LE Dressing: Independent  Toileting: Independent  Tone RUE  RUE Tone: Normotonic  Tone LUE  LUE Tone: Normotonic  Coordination  Movements Are Fluid And Coordinated: Yes     Bed mobility  Supine to Sit: Independent  Sit to Supine: Independent  Transfers  Sit to stand: Independent  Stand to sit:  Independent  Transfer Comments: discussed recommendation on waiting up to 10 mins prior to ambulation from transition to seated position     Cognition  Overall Cognitive Status: WFL                 LUE AROM (degrees)  LUE AROM : WFL  Left Hand AROM (degrees)  Left Hand AROM: WFL  RUE AROM (degrees)  RUE AROM : WFL  Right Hand AROM (degrees)  Right Hand AROM: WFL      Plan   Plan  Times per week: 1x only      AM-PAC Score        AM-PAC Inpatient Daily Activity Raw Score: 23 (01/18/22 1210)  AM-PAC Inpatient ADL T-Scale Score : 51.12 (01/18/22 1210)  ADL Inpatient CMS 0-100% Score: 15.86 (01/18/22 1210)  ADL Inpatient CMS G-Code Modifier : CI (01/18/22 1210)    Goals  Short term goals  Time Frame for Short term goals: No acute OT goals identified       Therapy Time   Individual Concurrent Group Co-treatment   Time In 1120         Time Out 1150         Minutes 30         Timed Code Treatment Minutes: Karely 1, OTR/L

## 2022-01-19 ENCOUNTER — CARE COORDINATION (OUTPATIENT)
Dept: CASE MANAGEMENT | Age: 27
End: 2022-01-19

## 2022-01-19 NOTE — CARE COORDINATION
Transitions of Care Call  Call within 2 business days of discharge: Yes    Patient: Pedro Bran Patient : 1995   MRN: [id number]  Reason for Admission: Covid, Seizure/syncope  Discharge Date: 22 RARS: Readmission Risk Score: 5 ( )      Last Discharge [facility]       Complaint Diagnosis Description Type Department Provider    22 Seizures  ED to Hosp-Admission (Discharged) (ADMITTED) Zuleima Garcia MD; Maria T Sanchez . .. Was this an external facility discharge? No Discharge Facility: n/a    Challenges to be reviewed by the provider   Additional needs identified to be addressed with provider: No  none                 Encounter was not routed to provider for escalation. Method of communication with provider: none. Discussed COVID-19 related testing which was: available at this time. Test results were: positive. Patient informed of results, if available? n/a. First initial 24 hr follow up outreach call attempt, no answer. CTN left VM with contact information and request for return call. CTN will continue with outreach call attempts.     BREONNA Deluca, RN   Care Transition Nurse  Mobile: [phone]

## 2022-01-20 ENCOUNTER — CARE COORDINATION (OUTPATIENT)
Dept: CASE MANAGEMENT | Age: 27
End: 2022-01-20

## 2022-10-02 ENCOUNTER — APPOINTMENT (OUTPATIENT)
Dept: GENERAL RADIOLOGY | Age: 27
End: 2022-10-02
Payer: MEDICAID

## 2022-10-02 ENCOUNTER — HOSPITAL ENCOUNTER (EMERGENCY)
Age: 27
Discharge: HOME | End: 2022-10-02
Attending: EMERGENCY MEDICINE
Payer: MEDICAID

## 2022-10-02 ENCOUNTER — APPOINTMENT (OUTPATIENT)
Dept: CT IMAGING | Age: 27
End: 2022-10-02
Payer: MEDICAID

## 2022-10-02 VITALS
HEIGHT: 66 IN | OXYGEN SATURATION: 100 % | HEART RATE: 91 BPM | BODY MASS INDEX: 35.36 KG/M2 | TEMPERATURE: 98 F | WEIGHT: 220.02 LBS | DIASTOLIC BLOOD PRESSURE: 72 MMHG | SYSTOLIC BLOOD PRESSURE: 103 MMHG | RESPIRATION RATE: 20 BRPM

## 2022-10-02 DIAGNOSIS — Z98.2 S/P VP SHUNT: ICD-10-CM

## 2022-10-02 DIAGNOSIS — R07.9 CHEST PAIN, UNSPECIFIED TYPE: Primary | ICD-10-CM

## 2022-10-02 DIAGNOSIS — G89.29 CHRONIC NONINTRACTABLE HEADACHE, UNSPECIFIED HEADACHE TYPE: ICD-10-CM

## 2022-10-02 DIAGNOSIS — R51.9 CHRONIC NONINTRACTABLE HEADACHE, UNSPECIFIED HEADACHE TYPE: ICD-10-CM

## 2022-10-02 LAB
A/G RATIO: 1.5 (ref 1.1–2.2)
ALBUMIN SERPL-MCNC: 4.3 G/DL (ref 3.4–5)
ALP BLD-CCNC: 66 U/L (ref 40–129)
ALT SERPL-CCNC: 36 U/L (ref 10–40)
ANION GAP SERPL CALCULATED.3IONS-SCNC: 15 MMOL/L (ref 3–16)
AST SERPL-CCNC: 25 U/L (ref 15–37)
BASOPHILS ABSOLUTE: 0 K/UL (ref 0–0.2)
BASOPHILS RELATIVE PERCENT: 0.5 %
BILIRUB SERPL-MCNC: <0.2 MG/DL (ref 0–1)
BILIRUBIN URINE: NEGATIVE
BLOOD, URINE: NEGATIVE
BUN BLDV-MCNC: 17 MG/DL (ref 7–20)
CALCIUM SERPL-MCNC: 9.4 MG/DL (ref 8.3–10.6)
CHLORIDE BLD-SCNC: 99 MMOL/L (ref 99–110)
CLARITY: CLEAR
CO2: 20 MMOL/L (ref 21–32)
COLOR: YELLOW
CREAT SERPL-MCNC: 0.6 MG/DL (ref 0.6–1.1)
EOSINOPHILS ABSOLUTE: 0.2 K/UL (ref 0–0.6)
EOSINOPHILS RELATIVE PERCENT: 2.3 %
GFR AFRICAN AMERICAN: >60
GFR NON-AFRICAN AMERICAN: >60
GLUCOSE BLD-MCNC: 208 MG/DL (ref 70–99)
GLUCOSE URINE: 250 MG/DL
HCG(URINE) PREGNANCY TEST: NEGATIVE
HCT VFR BLD CALC: 36.1 % (ref 36–48)
HEMOGLOBIN: 11.8 G/DL (ref 12–16)
KEPPRA DOSE AMT: NORMAL
KEPPRA: 9 UG/ML (ref 6–46)
KETONES, URINE: 40 MG/DL
LEUKOCYTE ESTERASE, URINE: NEGATIVE
LIPASE: 22 U/L (ref 13–60)
LYMPHOCYTES ABSOLUTE: 2.7 K/UL (ref 1–5.1)
LYMPHOCYTES RELATIVE PERCENT: 40 %
MCH RBC QN AUTO: 25.4 PG (ref 26–34)
MCHC RBC AUTO-ENTMCNC: 32.8 G/DL (ref 31–36)
MCV RBC AUTO: 77.6 FL (ref 80–100)
MICROSCOPIC EXAMINATION: ABNORMAL
MONOCYTES ABSOLUTE: 0.4 K/UL (ref 0–1.3)
MONOCYTES RELATIVE PERCENT: 5.5 %
NEUTROPHILS ABSOLUTE: 3.5 K/UL (ref 1.7–7.7)
NEUTROPHILS RELATIVE PERCENT: 51.7 %
NITRITE, URINE: NEGATIVE
PDW BLD-RTO: 14.9 % (ref 12.4–15.4)
PH UA: 5.5 (ref 5–8)
PLATELET # BLD: 228 K/UL (ref 135–450)
PMV BLD AUTO: 8.7 FL (ref 5–10.5)
POTASSIUM REFLEX MAGNESIUM: 3.7 MMOL/L (ref 3.5–5.1)
PROTEIN UA: NEGATIVE MG/DL
RBC # BLD: 4.66 M/UL (ref 4–5.2)
SARS-COV-2, NAAT: NOT DETECTED
SODIUM BLD-SCNC: 134 MMOL/L (ref 136–145)
SPECIFIC GRAVITY UA: 1.03 (ref 1–1.03)
TOTAL PROTEIN: 7.1 G/DL (ref 6.4–8.2)
TROPONIN: <0.01 NG/ML
URINE REFLEX TO CULTURE: ABNORMAL
URINE TYPE: ABNORMAL
UROBILINOGEN, URINE: 0.2 E.U./DL
WBC # BLD: 6.8 K/UL (ref 4–11)

## 2022-10-02 PROCEDURE — 71045 X-RAY EXAM CHEST 1 VIEW: CPT

## 2022-10-02 PROCEDURE — 84703 CHORIONIC GONADOTROPIN ASSAY: CPT

## 2022-10-02 PROCEDURE — 87635 SARS-COV-2 COVID-19 AMP PRB: CPT

## 2022-10-02 PROCEDURE — 85025 COMPLETE CBC W/AUTO DIFF WBC: CPT

## 2022-10-02 PROCEDURE — 93005 ELECTROCARDIOGRAM TRACING: CPT

## 2022-10-02 PROCEDURE — 99285 EMERGENCY DEPT VISIT HI MDM: CPT

## 2022-10-02 PROCEDURE — 80177 DRUG SCRN QUAN LEVETIRACETAM: CPT

## 2022-10-02 PROCEDURE — 81003 URINALYSIS AUTO W/O SCOPE: CPT

## 2022-10-02 PROCEDURE — 70450 CT HEAD/BRAIN W/O DYE: CPT

## 2022-10-02 PROCEDURE — 80053 COMPREHEN METABOLIC PANEL: CPT

## 2022-10-02 PROCEDURE — 84484 ASSAY OF TROPONIN QUANT: CPT

## 2022-10-02 PROCEDURE — 83690 ASSAY OF LIPASE: CPT

## 2022-10-02 PROCEDURE — 6370000000 HC RX 637 (ALT 250 FOR IP)

## 2022-10-02 PROCEDURE — 2580000003 HC RX 258

## 2022-10-02 RX ORDER — ACETAMINOPHEN 325 MG/1
650 TABLET ORAL ONCE
Status: COMPLETED | OUTPATIENT
Start: 2022-10-02 | End: 2022-10-02

## 2022-10-02 RX ORDER — SODIUM CHLORIDE, SODIUM LACTATE, POTASSIUM CHLORIDE, AND CALCIUM CHLORIDE .6; .31; .03; .02 G/100ML; G/100ML; G/100ML; G/100ML
1000 INJECTION, SOLUTION INTRAVENOUS ONCE
Status: COMPLETED | OUTPATIENT
Start: 2022-10-02 | End: 2022-10-02

## 2022-10-02 RX ADMIN — SODIUM CHLORIDE, POTASSIUM CHLORIDE, SODIUM LACTATE AND CALCIUM CHLORIDE 1000 ML: 600; 310; 30; 20 INJECTION, SOLUTION INTRAVENOUS at 20:20

## 2022-10-02 RX ADMIN — ACETAMINOPHEN 650 MG: 325 TABLET ORAL at 20:18

## 2022-10-02 ASSESSMENT — PAIN SCALES - GENERAL: PAINLEVEL_OUTOF10: 0

## 2022-10-02 NOTE — ED NOTES
Pt reports she was having chest pain and called 911. She fell while waiting for EMS.      Jaimee Noguera RN  10/02/22 5141

## 2022-10-02 NOTE — ED NOTES
Unable to complete orthostatics due to patient getting dizzy when standing     Shyla Palmer RN  10/02/22 2021

## 2022-10-02 NOTE — ED TRIAGE NOTES
Sandrine Adkins is a 28 y.o. female was brought by squad for eval of chest pain that started 3 hours ago and caused her to get hot. The pain is a 9/10. The patient is also complaining of a headache that started after she fell in her driveway waiting for an ambulance when her hand slipped. The patient has a shunt and noticed some swelling in her abd where the shunt drains. The patient is alert and oriented with an open and patent airway with a normal respiratory effort.

## 2022-10-02 NOTE — ED PROVIDER NOTES
[address] [facility]        Pt Name: Jm Wilkinson  MRN: [id number]  [place] 1995  Date of evaluation: 10/2/2022  Provider: SANDY Nowak - ANUP  PCP: Agus Vazquez  Note Started: 6:24 PM EDT       I have seen and evaluated this patient with my supervising physician Ivonne Leon MD.      [address]       Chief Complaint   Patient presents with    Chest Pain     X 3 hours in the middle of her chest that makes her feel hot 9/10      Headache     The patient fell when her hand slipped while waiting in her driveway for the ambulance, headache 9/10, pt has a shunt and hit her head where the shunt is and noticed some swelling where her shunt drains in her abd          HISTORY OF PRESENT ILLNESS   (Location/Symptom, Timing/Onset, Context/Setting, Quality, Duration, Modifying Factors, Severity)  Note limiting factors. Chief Complaint: Above    Jm Wilkinson is a 28 y.o. female who presents to the ED for evaluation of chest pain which is been ongoing for the past 2 to 3 days. Also concerned about left-sided abdominal pain ongoing for the past couple of weeks. She is also concerned about a headache which is been ongoing for the past couple of months intermittently. 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 This was last revised in 2014 per her mother Lamar Pacheco with whom I discussed the case over the phone and then at the bedside when she arrives to the ED. Per her mother she is unable to get in with neurosurgeon who evaluates  shunt's and is requesting a referral.  She does have a history of seizures and is adherent to her medication regimen of Keppra. Nursing Notes were all reviewed and agreed with or any disagreements were addressed in the HPI.     REVIEW OF SYSTEMS    (2-9 systems for level 4, 10 or more for level 5)     Review of Systems Constitutional:  Negative for chills, diaphoresis and fever. HENT:  Negative for congestion, rhinorrhea and sore throat. Eyes:  Negative for pain and visual disturbance. Respiratory:  Negative for cough and shortness of breath. Cardiovascular:  Positive for chest pain. Negative for leg swelling. Gastrointestinal:  Positive for abdominal pain. Negative for constipation, diarrhea, nausea and vomiting. Genitourinary:  Negative for dysuria, frequency and hematuria. Musculoskeletal:  Negative for back pain and neck pain. Skin:  Negative for rash and wound. Neurological:  Positive for headaches. Negative for dizziness and light-headedness. PAST MEDICAL HISTORY     Past Medical History:   Diagnosis Date    Diabetes ([facility] [address]     Seizures ([facility] [address]     Stroke [facility])        SURGICAL HISTORY     Past Surgical History:   Procedure Laterality Date    BRAIN SURGERY      shunt       CURRENTMEDICATIONS       Discharge Medication List as of 10/2/2022  9:39 PM        CONTINUE these medications which have NOT CHANGED    Details   topiramate (TOPAMAX) 25 MG tablet Take 25 mg by mouth nightly Take 1/2 tab bedtime for 7 days TAKE 1 tab at bedtime for 7 days TAKE 1 1/2 tab at bedtime for 7 days take 2 tabs bedtime till seenHistorical Med      Exenatide (BYDUREON) 2 MG PEN Inject 2 mg into the skin once a weekHistorical Med      insulin lispro protamine & lispro (HUMALOG MIX) (75-25) 100 UNIT per ML SUSP injection vial Inject 50 Units into the skin 2 times daily (with meals)Historical Med      levETIRAcetam (KEPPRA) 500 MG tablet Take 1,500 mg by mouth 2 times daily Historical Med             ALLERGIES     Ancef [cefazolin]    FAMILYHISTORY     History reviewed. No pertinent family history.      SOCIAL HISTORY       Social History     Socioeconomic History    Marital status: Single     Spouse name: None    Number of children: None    Years of education: None    Highest education level: None   Tobacco Use    Smoking status: Never    Smokeless tobacco: Never   Vaping Use    Vaping Use: Never used   Substance and Sexual Activity    Alcohol use: No    Drug use: No       SCREENINGS    Hico Coma Scale  Eye Opening: Spontaneous  Best Verbal Response: Oriented  Best Motor Response: Obeys commands  [place] Coma Scale Score: 15        PHYSICAL EXAM    (up to 7 for level 4, 8 or more for level 5)     ED Triage Vitals [10/02/22 1650]   BP Temp Temp Source Heart Rate Resp SpO2 Height Weight   (!) 148/84 98 °F (36.7 °C) Oral 87 16 98 % 5' 6\" (1.676 m) 220 lb 0.3 oz (99.8 kg)       Physical Exam  Vitals and nursing note reviewed. Constitutional:       Appearance: Normal appearance. She is obese. She is not ill-appearing, toxic-appearing or diaphoretic. HENT:      Head: Normocephalic and atraumatic. Right Ear: External ear normal.      Left Ear: External ear normal.      Nose: Nose normal. No congestion or rhinorrhea. Mouth/Throat:      Mouth: Mucous membranes are moist.      Pharynx: Oropharynx is clear. No oropharyngeal exudate or posterior oropharyngeal erythema. Eyes:      General: No scleral icterus. Right eye: No discharge. Left eye: No discharge. Extraocular Movements: Extraocular movements intact. Conjunctiva/sclera: Conjunctivae normal.      Pupils: Pupils are equal, round, and reactive to light. Cardiovascular:      Rate and Rhythm: Normal rate and regular rhythm. Pulses: Normal pulses. Heart sounds: Normal heart sounds. No murmur heard. No friction rub. No gallop. Pulmonary:      Effort: Pulmonary effort is normal. No respiratory distress. Breath sounds: Normal breath sounds. No stridor. No wheezing, rhonchi or rales. Abdominal:      General: Abdomen is flat. Bowel sounds are normal. There is no distension. Palpations: Abdomen is soft. Tenderness: There is no abdominal tenderness. There is no right CVA tenderness, left CVA tenderness or guarding. Musculoskeletal:         General: Normal range of motion. Cervical back: Normal range of motion and neck supple. No rigidity or tenderness. Skin:     General: Skin is warm and dry. Capillary Refill: Capillary refill takes less than 2 seconds. Coloration: Skin is not jaundiced or pale. Findings: No rash. Neurological:      General: No focal deficit present. Mental Status: She is alert and oriented to person, place, and time. Mental status is at baseline. Comments: Shunt pump is palpable. Tubing not palpable. In a \"canal\" 2/2 length f time of insertion per mother.   - Alert and oriented to person, place, time, situation. - CN II-XII intact. - Full and symmetric strength bilateral upper and lower extremities. - Sensation intact to light touch in all extremities. - Normal rapidly alternating movements  - Patella reflexes are 2+ and symmetric.   - Normal finger to nose. Normal heel to shin.   - Normal Romberg.   - Gait is normal.  - Neg TOS  - No Nystagmus   Psychiatric:         Mood and Affect: Mood normal.         Behavior: Behavior normal.       DIAGNOSTIC RESULTS   LABS:    Labs Reviewed   CBC WITH AUTO DIFFERENTIAL - Abnormal; Notable for the following components:       Result Value    Hemoglobin 11.8 (*)     MCV 77.6 (*)     MCH 25.4 (*)     All other components within normal limits   COMPREHENSIVE METABOLIC PANEL W/ REFLEX TO MG FOR LOW K - Abnormal; Notable for the following components:    Sodium 134 (*)     CO2 20 (*)     Glucose 208 (*)     All other components within normal limits   URINALYSIS WITH REFLEX TO CULTURE - Abnormal; Notable for the following components:    Glucose, Ur 250 (*)     Ketones, Urine 40 (*)     All other components within normal limits   COVID-19, RAPID   LIPASE   TROPONIN   PREGNANCY, URINE   LEVETIRACETAM LEVEL       When ordered, only abnormal lab results are displayed.  All other labs were within normal range or not returned as of this dictation. EKG: When ordered, EKG's are interpreted by the Emergency Department Physician in the absence of a cardiologist.  Please see their note for interpretation of EKG. RADIOLOGY:   Non-plain film images such as CT, Ultrasound and MRI are read by the radiologist. Plain radiographic images are visualized andpreliminarily interpreted by the  ED Provider with the below findings:        Interpretation perthe Radiologist below, if available at the time of this note:    CT HEAD WO CONTRAST   Final Result   No acute intracranial abnormality. The patient's  shunt is stable. There is very little fluid within the   ventricular system which raises the question of over shunting. However, this   is not significantly changed from the prior exam         XR CHEST 1 VIEW   Final Result   Clear lungs. XR SHUNT SERIES PLACEMENT (<4 VIEWS)   Final Result   Unremarkable shunt series; stable appearance compared to prior study in. No results found. PROCEDURES   Unless otherwise noted below, none     Procedures    CRITICAL CARE TIME   Rick MONGE CNP, am the primary clinician of record  I provided 15 minutes of non concurrent Critical Care time, excluding separately reportable procedures. There was a high probability of clinically significant/life threatening deterioration in the patient's condition which required my urgent intervention.   This time spent assessing, reassessing patient, chart review and discussing case with other providers      CONSULTS:  None      EMERGENCY DEPARTMENT COURSE and DIFFERENTIAL DIAGNOSIS/MDM:   Vitals:    Vitals:    10/02/22 1843 10/02/22 1900 10/02/22 1915 10/02/22 1919   BP:  124/80 123/85 103/72   Pulse:  90 (!) 101 91   Resp: 18 15 23 20   Temp:       TempSrc:       SpO2: 100% 98% 99% 100%   Weight:       Height:           Patient was given thefollowing medications:  Medications   lactated ringers bolus (0 mLs IntraVENous Stopped 10/2/22 2146) acetaminophen (TYLENOL) tablet 650 mg (650 mg Oral Given 10/2/22 2018)         Is this patient to be included in the SEP-1 Core Measure due to severe sepsis or septic shock? No   Exclusion criteria - the patient is NOT to be included for SEP-1 Core Measure due to: Infection is not suspected    Regarding chest pain x 3 days-  Differential Diagnosis: Acute Coronary Syndrome, Congestive Heart Failure, Thoracic Dissection, Pericarditis, Pericardial Effusion, Pulmonary Embolism, Pneumonia, Pneumothorax, Ischemic Bowel, Bowel Obstruction, PUD, GERD, Acute Cholecystitis, Pancreatitis, Hepatitis, Colitis, other    With respect to the patient's complaint of chest pain:   The patient's EKG reveals no acute ischemic abnormalities. Troponin is within normal limits. The presentation is not consistent with pulmonary embolism based on PERC, WELLS and, as such, the risk of radiation exposure for CTPA is deemed greater than the potential diagnostic benefit. Not consistent with esophageal rupture as patient is nontoxic and no history of multiple episodes of forceful vomiting. Not consistent with pneumothorax as negative chest x-ray. Not consistent with aortic dissection as pain not tearing, not sudden in onset, not migratory, pulses are symmetric, and there are no neurological deficits. As below, patient's HEART score calculates to low risk, and therefore supports early discharge with close PCP follow up. HEART SCORE:  History: +0 for low suspicion  EKG: +0 for normal EKG   Age: [de-identified] for age <45 years  Risk factors (includes HLD, HTN, DM, tobacco use, obesity, and +FHx): +2 for known CAD or 3+ risk factors  Initial troponin: +0 for negative troponin    Heart score: 2.   This falls under the following category: Score of 0-3, which indicates a very low risk for major adverse cardiac event and supports early discharge    Regarding Headache-  Differential Diagnosis: Subarachnoid hemorrhage, Meningitis, Temporal arteritis, Pseudotumor Cerebri, Migraine, other    The headache is consistent with patient's previous headaches. It is not described as worst headache of life. It was not \"thunderclap\"/maximal in intensity at onset. There are no associated neurological deficits. As such, I do not believe the headache is consistent with subarachnoid hemorrhage, dural venous sinus thrombosis, or other intracranial hemorrhage and therefore do not believe the patient would benefit from further evaluation for such. Given history of shunt with no recent evaluation by neurosurgery appropriate imaging was ordered. Shunt series without acute findings. CT head with potential over shunting but this is chronic and not new. Keppra levels within normal limits. Not presenting with seizure-like activity. Excellent symptom resolution with Tylenol and hydration. As such, patient is appropriate for discharge with close follow-up with neurosurgery. She was referred to [place] neurosurgery for further evaluation of her shunt    Labs:  Urinalysis is without infection. Pregnancy test is negative. Keppra level is within normal limits. CBC without acute findings. Chronic anemia with a hemoglobin of 11.8. No leukocytosis or ANC elevation. CMP with a sodium 134 with a glucose of 208. Pseudohyponatremia. Renal function is within normal limits. There is no transaminitis. Lipase is not elevated. Troponin test is negative. Rapid COVID is negative. On reassessment the patient is feel a lot better. She ambulates that distress. She is successfully p.o. challenged. As such she is appropriate for outpatient management. Plan was discussed with mother and patient at bedside and they are agreeable. Findings from today were also discussed with them. The patient is at low risk for mortality based on demographic, history and clinical factors.  Given the best available information and clinical assessment, I estimate the risk of hospitalization to be greater than risk of treatment at home. I have explained to the patient that the risk could rapidly change, given precautions for return and instructions. Explained to patient that the risk for mortality is low based on demographic, history and clinical factors. I discussed with patient the results of evaluation in the ED, diagnosis, care, and prognosis. The plan is to discharge to home. Patient is in agreement with plan and questions have been answered. I also discussed with patient the reasons which may require a return visit and the importance of follow-up care. The patient is well-appearing, nontoxic, and improved at the time of discharge. Patient agrees to call to arrange follow-up care as directed. Patient understands to return immediately for worsening/change in symptoms. I am the Primary Clinician of Record. FINAL IMPRESSION      1. Chest pain, unspecified type    2. S/P  shunt    3.  Chronic nonintractable headache, unspecified headache type          DISPOSITION/PLAN   DISPOSITION Decision To Discharge 10/02/2022 08:51:13 PM      PATIENT REFERREDTO:  Ajit Goodwin  [address] [place] [address] [facility]    Call in 1 day      [address] [place] [address]  Call in 1 day  Follow up on your recent ED visit - ask for  shunt specialist    DISCHARGE MEDICATIONS:  Discharge Medication List as of 10/2/2022  9:39 PM          DISCONTINUED MEDICATIONS:  Discharge Medication List as of 10/2/2022  9:39 PM                 (Please note that portions ofthis note were completed with a voice recognition program.  Efforts were made to edit the dictations but occasionally words are mis-transcribed.)    SANDY Garcia CNP (electronically signed)             SANDY Garcia CNP  10/03/22 4160

## 2022-10-02 NOTE — ED NOTES
Pt reports she fell while in radiology and bumped her head.   She states \"I felt very heavy\"     René Brown RN  10/02/22 1740

## 2022-10-02 NOTE — ED NOTES
Pt was assisted by RN to bathroom for urine sample. Pt began to feel dizzy and patient was assisted to the floor. Rob ROSA and Virgie Hill NP observed this syncopal episode. EKG and bed monitor ordered. Patient verbalized understanding of asking for help if she needs to get out of bed.   Posey monitor placed     Mauricio Johnson RN  10/02/22 0857

## 2022-10-03 LAB
EKG ATRIAL RATE: 89 BPM
EKG DIAGNOSIS: NORMAL
EKG P AXIS: 41 DEGREES
EKG P-R INTERVAL: 142 MS
EKG Q-T INTERVAL: 376 MS
EKG QRS DURATION: 92 MS
EKG QTC CALCULATION (BAZETT): 457 MS
EKG R AXIS: 35 DEGREES
EKG T AXIS: 5 DEGREES
EKG VENTRICULAR RATE: 89 BPM

## 2022-10-03 PROCEDURE — 93010 ELECTROCARDIOGRAM REPORT: CPT | Performed by: INTERNAL MEDICINE

## 2022-10-03 ASSESSMENT — ENCOUNTER SYMPTOMS
SHORTNESS OF BREATH: 0
ABDOMINAL PAIN: 1
CONSTIPATION: 0
SORE THROAT: 0
COUGH: 0
NAUSEA: 0
VOMITING: 0
BACK PAIN: 0
RHINORRHEA: 0
DIARRHEA: 0
EYE PAIN: 0

## 2022-10-03 NOTE — DISCHARGE INSTRUCTIONS
Please take all medication as prescribed. Please follow-up with neurosurgery as instructed.   Return to ED for any worsening symptoms as we discussed

## 2022-10-03 NOTE — ED NOTES
Provider order placed for patient's discharge. Provider reviewed decision to discharge with the patient. Discharge paperwork and any prescriptions were reviewed with the patient. Patient verbalized understanding of discharge education and any prescriptions and has no further questions or further needs at this time. Patient left with all personal belongings and was stable upon departure. Patient thanked for choosing [facility] ([facility]) and informed to return should any need arise.        Brandon Mojica RN  10/02/22 9101

## 2022-10-03 NOTE — ED PROVIDER NOTES
Attending Supervisory Note/Shared Visit   I have personally performed a face to face diagnostic evaluation on this patient. I have reviewed the mid-levels findings and agree. History and Exam by me shows an alert white female no acute distress. She has a history of MRDD. She has a  shunt. Her last revision was in 2014 which is when she last saw neurosurgery. Her mother says she has been having headaches on and off for several weeks. She feels lightheaded at times. She was complaining of some discomfort in her chest for 2 days. She is complaining of some swelling in her left upper abdomen. General: Alert moderately obese white female in no acute distress. Head: Atraumatic. Scar in her right parietal area. It is difficult to palpate her shunt beneath her scalp. Eyes: Pupils equal round reactive. Extraocular movements are intact. ENT: Nose clear. Oropharynx negative. Heart: Regular rate and rhythm. No murmurs or gallops noted. Lungs: Breath sounds equal bilaterally and clear. Abdomen: Soft, nondistended, nontender. EKG: Normal sinus rhythm, rate of 87, normal EKG. Rhythm strip shows a sinus rhythm with a rate of 87, MS interval 144 ms, QRS of 86 ms with no other ectopy as interpreted by me. This compared to an EKG dated 1/16/2022, the previously noted prolonged QT has resolved. EKG #2: Normal sinus rhythm, rate of 89, normal EKG. Rhythm strip shows a sinus rhythm with a rate of 89, MS interval 142 ms, QRS 92 ms with no other ectopy as interpreted by me. Unchanged compared to EKG done earlier today. Lab reviewed. H&H of 11.8 36.1. White blood cell count 6852 neutrophils and 40 lymphs. Sodium 134 with a potassium of 3.7. BUN of 17 with a creatinine of 0.6. Glucose of 208. Keppra level of 9.0. Lipase of 22. Troponin less than 0.01. COVID-negative. CT head: No acute intracranial abnormality. Patient's  shunt is stable.   There is very little fluid within the ventricular system which raises the question of over shunting. However this is not significantly changed from prior exam.    Chest x-ray: No acute abnormalities. X-ray shunt series: Unremarkable shunt series stable appearance compared to prior study. Patient will be given a neurosurgery referral for follow-up because of her ongoing headaches and concerns about her shunt. We see no definite evidence of shunt malfunction at this point in time, although per her mother she has had problems with her shunt in the past and her work-up has been unremarkable. My index of suspicion for cardiac etiology of her chest pain is low. It may be esophageal spasm/GERD. She will follow-up with her primary care physician. Test results, diagnosis, and treatment plan were discussed with the patient and her mother. He understands treatment plan follow-up as discussed. Diagnosis: Chest pain  Chronic non-intractable headache  Status post  shunt    Disposition: Discharge home.       (Please note that portions of this note were completed with a voice recognition program.  Efforts were made to edit the dictations but occasionally words are mis-transcribed.)    May Carrillo MD  Attending Emergency Physician        Kavita Benito MD  10/02/22 2985

## 2023-08-31 ENCOUNTER — APPOINTMENT (OUTPATIENT)
Dept: GENERAL RADIOLOGY | Age: 28
End: 2023-08-31
Payer: COMMERCIAL

## 2023-08-31 ENCOUNTER — HOSPITAL ENCOUNTER (EMERGENCY)
Age: 28
Discharge: HOME | End: 2023-08-31
Attending: EMERGENCY MEDICINE
Payer: COMMERCIAL

## 2023-08-31 VITALS
BODY MASS INDEX: 35.01 KG/M2 | RESPIRATION RATE: 16 BRPM | TEMPERATURE: 98.1 F | WEIGHT: 217.81 LBS | DIASTOLIC BLOOD PRESSURE: 78 MMHG | SYSTOLIC BLOOD PRESSURE: 120 MMHG | HEART RATE: 87 BPM | OXYGEN SATURATION: 98 % | HEIGHT: 66 IN

## 2023-08-31 DIAGNOSIS — R19.7 DIARRHEA, UNSPECIFIED TYPE: ICD-10-CM

## 2023-08-31 DIAGNOSIS — E86.0 DEHYDRATION: Primary | ICD-10-CM

## 2023-08-31 DIAGNOSIS — R07.89 CHEST WALL PAIN: ICD-10-CM

## 2023-08-31 DIAGNOSIS — R10.84 GENERALIZED ABDOMINAL PAIN: ICD-10-CM

## 2023-08-31 DIAGNOSIS — R11.0 NAUSEA: ICD-10-CM

## 2023-08-31 LAB
ALBUMIN SERPL-MCNC: 4.2 G/DL (ref 3.4–5)
ALBUMIN/GLOB SERPL: 1.5 {RATIO} (ref 1.1–2.2)
ALP SERPL-CCNC: 77 U/L (ref 40–129)
ALT SERPL-CCNC: 23 U/L (ref 10–40)
ANION GAP SERPL CALCULATED.3IONS-SCNC: 12 MMOL/L (ref 3–16)
AST SERPL-CCNC: 19 U/L (ref 15–37)
BACTERIA URNS QL MICRO: ABNORMAL /HPF
BASOPHILS # BLD: 0 K/UL (ref 0–0.2)
BASOPHILS NFR BLD: 0.5 %
BILIRUB SERPL-MCNC: <0.2 MG/DL (ref 0–1)
BILIRUB UR QL STRIP.AUTO: NEGATIVE
BUN SERPL-MCNC: 11 MG/DL (ref 7–20)
CALCIUM SERPL-MCNC: 9.1 MG/DL (ref 8.3–10.6)
CHLORIDE SERPL-SCNC: 105 MMOL/L (ref 99–110)
CLARITY UR: ABNORMAL
CO2 SERPL-SCNC: 22 MMOL/L (ref 21–32)
COLOR UR: ABNORMAL
CREAT SERPL-MCNC: 0.7 MG/DL (ref 0.6–1.1)
D DIMER: <0.27 UG/ML FEU (ref 0–0.6)
DEPRECATED RDW RBC AUTO: 15.5 % (ref 12.4–15.4)
EKG ATRIAL RATE: 89 BPM
EKG DIAGNOSIS: NORMAL
EKG P AXIS: 55 DEGREES
EKG P-R INTERVAL: 138 MS
EKG Q-T INTERVAL: 362 MS
EKG QRS DURATION: 90 MS
EKG QTC CALCULATION (BAZETT): 440 MS
EKG R AXIS: 65 DEGREES
EKG T AXIS: 33 DEGREES
EKG VENTRICULAR RATE: 89 BPM
EOSINOPHIL # BLD: 0.1 K/UL (ref 0–0.6)
EOSINOPHIL NFR BLD: 1.6 %
EPI CELLS #/AREA URNS AUTO: 19 /HPF (ref 0–5)
GFR SERPLBLD CREATININE-BSD FMLA CKD-EPI: >60 ML/MIN/{1.73_M2}
GLUCOSE SERPL-MCNC: 197 MG/DL (ref 70–99)
GLUCOSE UR STRIP.AUTO-MCNC: NEGATIVE MG/DL
HCG SERPL QL: NEGATIVE
HCT VFR BLD AUTO: 33.7 % (ref 36–48)
HGB BLD-MCNC: 10.9 G/DL (ref 12–16)
HGB UR QL STRIP.AUTO: ABNORMAL
HYALINE CASTS #/AREA URNS AUTO: 2 /LPF (ref 0–8)
KETONES UR STRIP.AUTO-MCNC: NEGATIVE MG/DL
LEUKOCYTE ESTERASE UR QL STRIP.AUTO: ABNORMAL
LIPASE SERPL-CCNC: 24 U/L (ref 13–60)
LYMPHOCYTES # BLD: 2.6 K/UL (ref 1–5.1)
LYMPHOCYTES NFR BLD: 38.3 %
MCH RBC QN AUTO: 24.3 PG (ref 26–34)
MCHC RBC AUTO-ENTMCNC: 32.3 G/DL (ref 31–36)
MCV RBC AUTO: 75.3 FL (ref 80–100)
MONOCYTES # BLD: 0.3 K/UL (ref 0–1.3)
MONOCYTES NFR BLD: 4.8 %
NEUTROPHILS # BLD: 3.8 K/UL (ref 1.7–7.7)
NEUTROPHILS NFR BLD: 54.8 %
NITRITE UR QL STRIP.AUTO: NEGATIVE
PH UR STRIP.AUTO: 7 [PH] (ref 5–8)
PLATELET # BLD AUTO: 284 K/UL (ref 135–450)
PMV BLD AUTO: 9 FL (ref 5–10.5)
POTASSIUM SERPL-SCNC: 3.8 MMOL/L (ref 3.5–5.1)
PROT SERPL-MCNC: 7 G/DL (ref 6.4–8.2)
PROT UR STRIP.AUTO-MCNC: 30 MG/DL
RBC # BLD AUTO: 4.47 M/UL (ref 4–5.2)
RBC CLUMPS #/AREA URNS AUTO: 31 /HPF (ref 0–4)
SARS-COV-2 RDRP RESP QL NAA+PROBE: NOT DETECTED
SODIUM SERPL-SCNC: 139 MMOL/L (ref 136–145)
SP GR UR STRIP.AUTO: 1.02 (ref 1–1.03)
TROPONIN, HIGH SENSITIVITY: <6 NG/L (ref 0–14)
UA COMPLETE W REFLEX CULTURE PNL UR: ABNORMAL
UA DIPSTICK W REFLEX MICRO PNL UR: YES
URN SPEC COLLECT METH UR: ABNORMAL
UROBILINOGEN UR STRIP-ACNC: 1 E.U./DL
WBC # BLD AUTO: 6.9 K/UL (ref 4–11)
WBC #/AREA URNS AUTO: 4 /HPF (ref 0–5)

## 2023-08-31 PROCEDURE — 96361 HYDRATE IV INFUSION ADD-ON: CPT

## 2023-08-31 PROCEDURE — 85379 FIBRIN DEGRADATION QUANT: CPT

## 2023-08-31 PROCEDURE — 6360000002 HC RX W HCPCS: Performed by: PHYSICIAN ASSISTANT

## 2023-08-31 PROCEDURE — 96374 THER/PROPH/DIAG INJ IV PUSH: CPT

## 2023-08-31 PROCEDURE — 87635 SARS-COV-2 COVID-19 AMP PRB: CPT

## 2023-08-31 PROCEDURE — 93005 ELECTROCARDIOGRAM TRACING: CPT | Performed by: PHYSICIAN ASSISTANT

## 2023-08-31 PROCEDURE — 99285 EMERGENCY DEPT VISIT HI MDM: CPT

## 2023-08-31 PROCEDURE — 84484 ASSAY OF TROPONIN QUANT: CPT

## 2023-08-31 PROCEDURE — 84703 CHORIONIC GONADOTROPIN ASSAY: CPT

## 2023-08-31 PROCEDURE — 85025 COMPLETE CBC W/AUTO DIFF WBC: CPT

## 2023-08-31 PROCEDURE — 80053 COMPREHEN METABOLIC PANEL: CPT

## 2023-08-31 PROCEDURE — 83690 ASSAY OF LIPASE: CPT

## 2023-08-31 PROCEDURE — 2580000003 HC RX 258: Performed by: PHYSICIAN ASSISTANT

## 2023-08-31 PROCEDURE — 81001 URINALYSIS AUTO W/SCOPE: CPT

## 2023-08-31 PROCEDURE — 93010 ELECTROCARDIOGRAM REPORT: CPT | Performed by: INTERNAL MEDICINE

## 2023-08-31 PROCEDURE — 71046 X-RAY EXAM CHEST 2 VIEWS: CPT

## 2023-08-31 RX ORDER — ONDANSETRON 4 MG/1
4 TABLET, FILM COATED ORAL EVERY 8 HOURS PRN
Qty: 20 TABLET | Refills: 0 | Status: SHIPPED | OUTPATIENT
Start: 2023-08-31

## 2023-08-31 RX ORDER — 0.9 % SODIUM CHLORIDE 0.9 %
1000 INTRAVENOUS SOLUTION INTRAVENOUS ONCE
Status: COMPLETED | OUTPATIENT
Start: 2023-08-31 | End: 2023-08-31

## 2023-08-31 RX ORDER — 0.9 % SODIUM CHLORIDE 0.9 %
500 INTRAVENOUS SOLUTION INTRAVENOUS ONCE
Status: COMPLETED | OUTPATIENT
Start: 2023-08-31 | End: 2023-08-31

## 2023-08-31 RX ORDER — KETOROLAC TROMETHAMINE 15 MG/ML
15 INJECTION, SOLUTION INTRAMUSCULAR; INTRAVENOUS ONCE
Status: COMPLETED | OUTPATIENT
Start: 2023-08-31 | End: 2023-08-31

## 2023-08-31 RX ADMIN — SODIUM CHLORIDE 500 ML: 9 INJECTION, SOLUTION INTRAVENOUS at 13:58

## 2023-08-31 RX ADMIN — KETOROLAC TROMETHAMINE 15 MG: 15 INJECTION, SOLUTION INTRAMUSCULAR; INTRAVENOUS at 16:07

## 2023-08-31 RX ADMIN — SODIUM CHLORIDE 1000 ML: 9 INJECTION, SOLUTION INTRAVENOUS at 15:57

## 2023-08-31 ASSESSMENT — PAIN DESCRIPTION - LOCATION
LOCATION: ABDOMEN
LOCATION: ABDOMEN
LOCATION: ABDOMEN;BACK

## 2023-08-31 ASSESSMENT — PAIN - FUNCTIONAL ASSESSMENT: PAIN_FUNCTIONAL_ASSESSMENT: 0-10

## 2023-08-31 ASSESSMENT — PAIN SCALES - GENERAL
PAINLEVEL_OUTOF10: 2
PAINLEVEL_OUTOF10: 4
PAINLEVEL_OUTOF10: 10

## 2023-08-31 ASSESSMENT — PAIN DESCRIPTION - PAIN TYPE: TYPE: ACUTE PAIN

## 2023-08-31 ASSESSMENT — PAIN DESCRIPTION - DESCRIPTORS: DESCRIPTORS: DISCOMFORT

## 2023-08-31 NOTE — ED NOTES
Vishal Chan, RN attempted twice for a PIV, both attempts unsuccessful. I asked ED charge to attempt.       Yana Cavazos RN  08/31/23 6596

## 2023-08-31 NOTE — ED NOTES
Sherren Daniel, ED charge attempted twice for PIV, both attempts were unsuccessful. She asked Xena Montiel, ED manager to attempt using US.       Jas Ballard RN  08/31/23 2036

## 2023-08-31 NOTE — ED NOTES
Discharge and education instructions reviewed. Patient verbalized understanding, teach-back successful. Patient denied questions at this time. No acute distress noted. Patient instructed to follow-up as noted - return to emergency department if symptoms worsen. Patient verbalized understanding. Discharged per EDMD with discharged instructions.        Ale Adkins RN  08/31/23 0816

## 2023-08-31 NOTE — DISCHARGE INSTRUCTIONS
Despite the nausea as needed. Take ibuprofen for pain in her chest/back as needed. Take 60 mg every 6-8 hours. Recommend he work on hydration, make sure you are drinking both water and either Pedialyte/Gatorade for electrolytes. Return to ED if you have any new or worsening symptoms. Follow closely primary care doctor as outpatient.

## 2023-08-31 NOTE — ED NOTES
Pt ambulated to the restroom; gait even and steady.  Mom is in the restroom with her daughter to assist.      Becca Penn RN  08/31/23 4520

## 2023-08-31 NOTE — ED PROVIDER NOTES
[address] [facility] [address] [address]        Pt Name: Ky Canavan  MRN: [id number]  [facility] 1995  Date of evaluation: 8/31/2023  Provider: Mariela Messer PA-C  PCP: Silvana Chowdhury  Note Started: 12:25 PM EDT 8/31/23       I have seen and evaluated this patient with my supervising physician No att. providers found. CHIEF COMPLAINT       Chief Complaint   Patient presents with    Abdominal Pain     X 2-3 days     Back Pain     Sharp going up and down her back    Illness     C/o tightness in chest, seeing spots, feeling like she is going to pass out        HISTORY OF PRESENT ILLNESS: 1 or more Elements     History From: patient  Limitations to history : None    Ky Canavan is a 28 y.o. female with a past medical history of hyperlipidemia, type 2 diabetes who presents to the emergency department by private vehicle. Patient states for the past 2 to 3 days she has had chest pain, back pain, abdominal pain, nausea, diarrhea and lightheadedness. Chest discomfort  describes a \"tightness and knot sensation\" centrally. Back pain radiates through her mid upper back. Back and chest pain seem to be related. Pain worsens with breathing and movement. Abdominal pain is generalized with associated nausea and diarrhea. Pain worsens with leaning forward. No other significant alleviating or artery factors. Denies any blood in stool. She has additionally been feeling lightheaded. Patients states she occasionally see spots and \"feels like she is going to pass out\". This worsens with standing. Denies having symptoms like this previously. Denies any cough, hemoptysis, shortness of breath, palpitations, fevers. Nursing Notes were all reviewed and agreed with or any disagreements were addressed in the HPI. REVIEW OF SYSTEMS :      Review of Systems    Positives and Pertinent negatives as per HPI.      SURGICAL HISTORY     Past Surgical History:   Procedure Seizures ([address]), and Stroke ([address] CONSULTS: (Who and What was discussed)  None      Social Determinants : None    Records Reviewed (Source):     CC/HPI Summary, DDx, ED Course, and Reassessment:     Patient presents to the ED with the HPI noted above. Patient with mild generalized abdominal pain on exam. After IV fluid rehydration and no medications pain resolved. Benign abdomen at this time. Do not believe imaging indicated. Patient with diarrhea. No BM in ED. Chest and back pain with movement/inspiration. Patient with no risk factors for PE. D-dimer normal. Do not suspect PE. Cardiac workup normal. EKG showed nsr. Troponin normal. Lungs clear. Pain reproducible. Suspect musculoskeletal. Pain relieved with toradol. Encouraged hydration and NSAIDS as outpatient. Lightheadedness, patient mildly hypotensive in ED. Reports lightheaded with ambulation improved after 500 cc IV fluid bolus. Patient given an additional 1L with resolution of symptoms. Ambulated in ED without symptoms or difficulty at this time. Labs as above. CBC showed no leukocytosis/leukopenia, HGB 10.9, similar to previous values. CMP showed no electrolyte abnormality, no renal or hepatic impairment. Lipase normal.     Workup reassuring. Symptoms have resolved with fluid and toradol. Do believe patient can be discharged home. Return precautions discussed. Patient comfortable with plan. She was discharged home in stable condition. The patient tolerated their visit well. They were seen and evaluated by the attending physician, Dr. Kaz Perry who agreed with the assessment and plan. The patient and / or the family were informed of the results of any tests, a time was given to answer questions, a plan was proposed and they agreed with plan. Disposition Considerations (tests considered but not done, Admit vs D/C, Shared Decision Making, Pt Expectation of Test or Tx.): see above      I am the Primary Clinician of Record.   FINAL

## 2023-08-31 NOTE — ED NOTES
Pt ambulated to the restroom; gait even and steady. Pt denies any dizziness. I will notify EDPA.       Keith Garcia RN  08/31/23 0973

## 2023-08-31 NOTE — ED PROVIDER NOTES
criteria. IMPRESSION(S):  1. Dehydration    2. Nausea    3. Chest wall pain    4. Generalized abdominal pain    5. Diarrhea, unspecified type      ?        Darnell Lyon,   09/01/23 0939

## 2023-08-31 NOTE — ED NOTES
2 attempts to establish PIV; both attempts unsuccessful. I asked Odell Iglesias RN to attempt.       Sandra Mota RN  08/31/23 [id number]

## 2025-03-03 ENCOUNTER — TRANSCRIBE ORDERS (OUTPATIENT)
Dept: ADMINISTRATIVE | Age: 30
End: 2025-03-03

## 2025-03-03 DIAGNOSIS — M25.571 PAIN IN RIGHT ANKLE AND JOINTS OF RIGHT FOOT: Primary | ICD-10-CM

## 2025-09-04 ENCOUNTER — TRANSCRIBE ORDERS (OUTPATIENT)
Dept: ADMINISTRATIVE | Age: 30
End: 2025-09-04

## 2025-09-04 DIAGNOSIS — M54.50 LOW BACK PAIN, UNSPECIFIED BACK PAIN LATERALITY, UNSPECIFIED CHRONICITY, UNSPECIFIED WHETHER SCIATICA PRESENT: Primary | ICD-10-CM
